# Patient Record
Sex: MALE | Race: BLACK OR AFRICAN AMERICAN | NOT HISPANIC OR LATINO | Employment: UNEMPLOYED | URBAN - METROPOLITAN AREA
[De-identification: names, ages, dates, MRNs, and addresses within clinical notes are randomized per-mention and may not be internally consistent; named-entity substitution may affect disease eponyms.]

---

## 2022-01-24 ENCOUNTER — OFFICE VISIT (OUTPATIENT)
Dept: FAMILY MEDICINE | Facility: CLINIC | Age: 32
End: 2022-01-24
Payer: COMMERCIAL

## 2022-01-24 VITALS
TEMPERATURE: 98 F | RESPIRATION RATE: 18 BRPM | SYSTOLIC BLOOD PRESSURE: 154 MMHG | OXYGEN SATURATION: 98 % | DIASTOLIC BLOOD PRESSURE: 95 MMHG | HEART RATE: 93 BPM | WEIGHT: 183 LBS | HEIGHT: 67 IN | BODY MASS INDEX: 28.72 KG/M2

## 2022-01-24 DIAGNOSIS — K31.84 DIABETIC GASTROPARESIS: ICD-10-CM

## 2022-01-24 DIAGNOSIS — E10.9 TYPE 1 DIABETES MELLITUS WITHOUT COMPLICATION: Primary | Chronic | ICD-10-CM

## 2022-01-24 DIAGNOSIS — E11.43 DIABETIC GASTROPARESIS: ICD-10-CM

## 2022-01-24 DIAGNOSIS — Z11.59 ENCOUNTER FOR SCREENING FOR OTHER VIRAL DISEASES: ICD-10-CM

## 2022-01-24 DIAGNOSIS — R03.0 ELEVATED BP WITHOUT DIAGNOSIS OF HYPERTENSION: ICD-10-CM

## 2022-01-24 LAB
ABS NRBC COUNT: 0 X 10 3/UL (ref 0–0.01)
ABSOLUTE BASOPHIL: 0.05 X 10 3/UL (ref 0–0.22)
ABSOLUTE EOSINOPHIL: 0.12 X 10 3/UL (ref 0.04–0.54)
ABSOLUTE IMMATURE GRAN: 0.03 X 10 3/UL (ref 0–0.04)
ABSOLUTE LYMPHOCYTE: 2.55 X 10 3/UL (ref 0.86–4.75)
ABSOLUTE MONOCYTE: 0.93 X 10 3/UL (ref 0.22–1.08)
ALBUMIN SERPL-MCNC: 4.1 G/DL (ref 3.5–5.2)
ALBUMIN/GLOB SERPL ELPH: 1.6 {RATIO} (ref 1–2.7)
ALP ISOS SERPL LEV INH-CCNC: 107 U/L (ref 40–130)
ALT (SGPT): 13 U/L (ref 0–41)
ANION GAP SERPL CALC-SCNC: 9 MMOL/L (ref 8–17)
AST SERPL-CCNC: 13 U/L (ref 0–40)
BASOPHILS NFR BLD: 0.5 % (ref 0.2–1.2)
BILIRUBIN, TOTAL: 0.25 MG/DL (ref 0–1.2)
BUN/CREAT SERPL: 13.6 (ref 6–20)
CALCIUM SERPL-MCNC: 9.3 MG/DL (ref 8.6–10.2)
CARBON DIOXIDE, CO2: 28 MMOL/L (ref 22–29)
CHLORIDE: 97 MMOL/L (ref 98–107)
CHOLEST SERPL-MSCNC: 154 MG/DL (ref 100–200)
CREAT SERPL-MCNC: 0.85 MG/DL (ref 0.7–1.2)
EOSINOPHIL NFR BLD: 1.2 % (ref 0.7–7)
GFR ESTIMATION: 105.84
GLOBULIN: 2.6 G/DL (ref 1.5–4.5)
GLUCOSE: 494 MG/DL (ref 74–106)
HCT VFR BLD AUTO: 39.3 % (ref 42–52)
HCV IGG SERPL QL IA: NONREACTIVE
HDLC SERPL-MCNC: 75 MG/DL
HGB BLD-MCNC: 12.9 G/DL (ref 14–18)
HIV 1+2 AB+HIV1 P24 AG SERPL QL IA: NONREACTIVE
IMMATURE GRANULOCYTES: 0.3 % (ref 0–0.5)
LDL/HDL RATIO: 0.9 (ref 1–3)
LDLC SERPL CALC-MCNC: 69 MG/DL (ref 0–100)
LYMPHOCYTES NFR BLD: 26 % (ref 19.3–53.1)
MCH RBC QN AUTO: 29.1 PG (ref 27–32)
MCHC RBC AUTO-ENTMCNC: 32.8 G/DL (ref 32–36)
MCV RBC AUTO: 88.7 FL (ref 80–94)
MONOCYTES NFR BLD: 9.5 % (ref 4.7–12.5)
NEUTROPHILS # BLD AUTO: 6.11 X 10 3/UL (ref 2.15–7.56)
NEUTROPHILS NFR BLD: 62.5 % (ref 34–71.1)
NUCLEATED RED BLOOD CELLS: 0 /100 WBC (ref 0–0.2)
PLATELET # BLD AUTO: 316 X 10 3/UL (ref 135–400)
POTASSIUM: 4.8 MMOL/L (ref 3.5–5.1)
PROT SNV-MCNC: 6.7 G/DL (ref 6.4–8.3)
RBC # BLD AUTO: 4.43 X 10 6/UL (ref 4.7–6.1)
RDW-SD: 43.8 FL (ref 37–54)
SODIUM: 134 MMOL/L (ref 136–145)
TRIGL SERPL-MCNC: 50 MG/DL (ref 0–150)
TSH W/REFLEX TO FT4: 0.98 UIU/ML (ref 0.27–4.2)
UREA NITROGEN (BUN): 11.6 MG/DL (ref 6–20)
WBC # BLD: 9.79 X 10 3/UL (ref 4.3–10.8)

## 2022-01-24 PROCEDURE — 1160F RVW MEDS BY RX/DR IN RCRD: CPT | Mod: CPTII,S$GLB,, | Performed by: NURSE PRACTITIONER

## 2022-01-24 PROCEDURE — 99204 PR OFFICE/OUTPT VISIT, NEW, LEVL IV, 45-59 MIN: ICD-10-PCS | Mod: S$GLB,,, | Performed by: NURSE PRACTITIONER

## 2022-01-24 PROCEDURE — 3077F SYST BP >= 140 MM HG: CPT | Mod: CPTII,S$GLB,, | Performed by: NURSE PRACTITIONER

## 2022-01-24 PROCEDURE — 1160F PR REVIEW ALL MEDS BY PRESCRIBER/CLIN PHARMACIST DOCUMENTED: ICD-10-PCS | Mod: CPTII,S$GLB,, | Performed by: NURSE PRACTITIONER

## 2022-01-24 PROCEDURE — 1159F PR MEDICATION LIST DOCUMENTED IN MEDICAL RECORD: ICD-10-PCS | Mod: CPTII,S$GLB,, | Performed by: NURSE PRACTITIONER

## 2022-01-24 PROCEDURE — 3080F DIAST BP >= 90 MM HG: CPT | Mod: CPTII,S$GLB,, | Performed by: NURSE PRACTITIONER

## 2022-01-24 PROCEDURE — 99204 OFFICE O/P NEW MOD 45 MIN: CPT | Mod: S$GLB,,, | Performed by: NURSE PRACTITIONER

## 2022-01-24 PROCEDURE — 1159F MED LIST DOCD IN RCRD: CPT | Mod: CPTII,S$GLB,, | Performed by: NURSE PRACTITIONER

## 2022-01-24 PROCEDURE — 3008F BODY MASS INDEX DOCD: CPT | Mod: CPTII,S$GLB,, | Performed by: NURSE PRACTITIONER

## 2022-01-24 PROCEDURE — 3008F PR BODY MASS INDEX (BMI) DOCUMENTED: ICD-10-PCS | Mod: CPTII,S$GLB,, | Performed by: NURSE PRACTITIONER

## 2022-01-24 PROCEDURE — 3077F PR MOST RECENT SYSTOLIC BLOOD PRESSURE >= 140 MM HG: ICD-10-PCS | Mod: CPTII,S$GLB,, | Performed by: NURSE PRACTITIONER

## 2022-01-24 PROCEDURE — 3080F PR MOST RECENT DIASTOLIC BLOOD PRESSURE >= 90 MM HG: ICD-10-PCS | Mod: CPTII,S$GLB,, | Performed by: NURSE PRACTITIONER

## 2022-01-24 RX ORDER — INSULIN DEGLUDEC 100 U/ML
INJECTION, SOLUTION SUBCUTANEOUS
COMMUNITY
End: 2022-01-24

## 2022-01-24 RX ORDER — INSULIN GLARGINE 300 [IU]/ML
30 INJECTION, SOLUTION SUBCUTANEOUS NIGHTLY
Qty: 1 PEN | Refills: 6 | Status: SHIPPED | OUTPATIENT
Start: 2022-01-24 | End: 2023-03-22

## 2022-01-24 RX ORDER — INSULIN LISPRO 100 [IU]/ML
6 INJECTION, SOLUTION INTRAVENOUS; SUBCUTANEOUS
COMMUNITY
Start: 2022-01-16 | End: 2022-01-24 | Stop reason: SDUPTHER

## 2022-01-24 RX ORDER — METOCLOPRAMIDE 10 MG/1
10 TABLET ORAL
Qty: 90 TABLET | Refills: 0 | Status: SHIPPED | OUTPATIENT
Start: 2022-01-24 | End: 2022-02-14

## 2022-01-24 RX ORDER — INSULIN LISPRO 100 [IU]/ML
6 INJECTION, SOLUTION INTRAVENOUS; SUBCUTANEOUS
Qty: 1 EACH | Refills: 6 | Status: SHIPPED | OUTPATIENT
Start: 2022-01-24 | End: 2022-05-13 | Stop reason: SDUPTHER

## 2022-01-24 RX ORDER — INSULIN GLARGINE 300 [IU]/ML
30 INJECTION, SOLUTION SUBCUTANEOUS NIGHTLY
COMMUNITY
End: 2022-01-24 | Stop reason: SDUPTHER

## 2022-01-24 RX ORDER — FLASH GLUCOSE SENSOR
KIT MISCELLANEOUS
COMMUNITY
Start: 2021-08-25 | End: 2022-05-05

## 2022-01-24 NOTE — PROGRESS NOTES
Subjective:       Patient ID: Tomer Boone is a 31 y.o. male.    Chief Complaint: Establish Care (Pt is here to establish care. Pt also needs refills on in his insulin)    HPI     He is here to establish primary care. 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  He was previously established [facility] with endocrinology. He has a PMH of Type 1 DM. He would like to establish with new endocrinologist in the future-- if needed. He was previously on Reglan for intermittent gastroparesis. Today, his BP is elevated. He reports BP is usually normal.     Review of Systems   Constitutional: Negative for chills, fatigue and fever.   Respiratory: Negative for cough, shortness of breath and wheezing.    Cardiovascular: Negative for chest pain and palpitations.   Gastrointestinal: Negative for abdominal pain, nausea and vomiting.   Musculoskeletal: Negative for joint swelling and myalgias.   Skin: Negative for color change and rash.   Neurological: Negative for dizziness, weakness, light-headedness and headaches.   Psychiatric/Behavioral: Negative for decreased concentration and sleep disturbance. The patient is not nervous/anxious.            Past Medical History:  Past Medical History:   Diagnosis Date    Diabetes mellitus type I       History reviewed. No pertinent surgical history.   Review of patient's allergies indicates:  No Known Allergies   Current Outpatient Medications   Medication Sig Dispense Refill    FREESTYLE MAURICIO 14 DAY SENSOR Kit       HUMALOG KWIKPEN INSULIN 100 unit/mL pen Inject 6 Units into the skin 3 (three) times daily with meals. 1 each 6    insulin glargine U-300 conc (TOUJEO MAX SOLOSTAR) 300 unit/mL (3 mL) insulin pen Inject 30 Units into the skin every evening. 1 pen 6    metoclopramide HCl (REGLAN) 10 MG tablet Take 1 tablet (10 mg total) by mouth 4 (four) times daily before meals and nightly. 90 tablet 0     No current  facility-administered medications for this visit.     Social History     Socioeconomic History    Marital status:    Tobacco Use    Smoking status: Never Smoker    Smokeless tobacco: Never Used   Substance and Sexual Activity    Alcohol use: Yes     Comment: socially    Drug use: Yes     Types: Marijuana    Sexual activity: Yes      History reviewed. No pertinent family history.     Objective:      Physical Exam  Constitutional:       Appearance: He is well-developed.   HENT:      Head: Normocephalic and atraumatic.      Mouth/Throat:      Mouth: Mucous membranes are moist.      Pharynx: Oropharynx is clear.   Eyes:      General: No scleral icterus.     Conjunctiva/sclera: Conjunctivae normal.   Neck:      Trachea: Trachea normal.   Cardiovascular:      Rate and Rhythm: Normal rate and regular rhythm.   Pulmonary:      Effort: Pulmonary effort is normal.      Breath sounds: Normal breath sounds.   Abdominal:      Palpations: Abdomen is soft.   Musculoskeletal:      Cervical back: Normal range of motion and neck supple.   Neurological:      Mental Status: He is alert and oriented to person, place, and time.   Psychiatric:         Mood and Affect: Mood normal.         Speech: Speech normal.         Behavior: Behavior normal.         Assessment:     1. Type 1 diabetes mellitus without complication Stable   2. Diabetic gastroparesis Acute   3. Elevated BP without diagnosis of hypertension Acute   4. Encounter for screening for other viral diseases      Plan:       PROBLEM LIST     Type 1 diabetes mellitus without complication  Comments:  obtaining labs today, including A1c; Renewing his insulin   Orders:  -     insulin glargine U-300 conc (TOUJEO MAX SOLOSTAR) 300 unit/mL (3 mL) insulin pen; Inject 30 Units into the skin every evening.  Dispense: 1 pen; Refill: 6  -     HUMALOG KWIKPEN INSULIN 100 unit/mL pen; Inject 6 Units into the skin 3 (three) times daily with meals.  Dispense: 1 each; Refill: 6  -      CBC Auto Differential  -     Comprehensive Metabolic Panel  -     Lipid Panel  -     TSH w/reflex to FT4    Diabetic gastroparesis  Comments:  renewing Reglan for prn use  Orders:  -     metoclopramide HCl (REGLAN) 10 MG tablet; Take 1 tablet (10 mg total) by mouth 4 (four) times daily before meals and nightly.  Dispense: 90 tablet; Refill: 0    Elevated BP without diagnosis of hypertension  Comments:  will repeat BP in 1 week at f/u appt    Encounter for screening for other viral diseases  -     Hepatitis C antibody  -     HIV 1/2 Ag/Ab (4th Gen)

## 2022-01-27 DIAGNOSIS — E10.9 TYPE 1 DIABETES MELLITUS WITHOUT COMPLICATION: Primary | ICD-10-CM

## 2022-01-27 LAB
ESTIMATED AVERAGE GLUCOSE: 222 MG/DL
HBA1C MFR BLD: 9.4 % (ref 4–6)

## 2022-01-31 ENCOUNTER — OFFICE VISIT (OUTPATIENT)
Dept: FAMILY MEDICINE | Facility: CLINIC | Age: 32
End: 2022-01-31
Payer: COMMERCIAL

## 2022-01-31 VITALS
TEMPERATURE: 98 F | BODY MASS INDEX: 29.03 KG/M2 | HEART RATE: 78 BPM | SYSTOLIC BLOOD PRESSURE: 130 MMHG | OXYGEN SATURATION: 98 % | DIASTOLIC BLOOD PRESSURE: 88 MMHG | HEIGHT: 67 IN | RESPIRATION RATE: 18 BRPM | WEIGHT: 185 LBS

## 2022-01-31 DIAGNOSIS — E10.9 TYPE 1 DIABETES MELLITUS WITHOUT COMPLICATION: Primary | ICD-10-CM

## 2022-01-31 DIAGNOSIS — E11.43 DIABETIC GASTROPARESIS: ICD-10-CM

## 2022-01-31 DIAGNOSIS — K31.84 DIABETIC GASTROPARESIS: ICD-10-CM

## 2022-01-31 PROCEDURE — 3075F PR MOST RECENT SYSTOLIC BLOOD PRESS GE 130-139MM HG: ICD-10-PCS | Mod: CPTII,S$GLB,, | Performed by: NURSE PRACTITIONER

## 2022-01-31 PROCEDURE — 3075F SYST BP GE 130 - 139MM HG: CPT | Mod: CPTII,S$GLB,, | Performed by: NURSE PRACTITIONER

## 2022-01-31 PROCEDURE — 3046F PR MOST RECENT HEMOGLOBIN A1C LEVEL > 9.0%: ICD-10-PCS | Mod: CPTII,S$GLB,, | Performed by: NURSE PRACTITIONER

## 2022-01-31 PROCEDURE — 1160F RVW MEDS BY RX/DR IN RCRD: CPT | Mod: CPTII,S$GLB,, | Performed by: NURSE PRACTITIONER

## 2022-01-31 PROCEDURE — 3079F PR MOST RECENT DIASTOLIC BLOOD PRESSURE 80-89 MM HG: ICD-10-PCS | Mod: CPTII,S$GLB,, | Performed by: NURSE PRACTITIONER

## 2022-01-31 PROCEDURE — 3008F BODY MASS INDEX DOCD: CPT | Mod: CPTII,S$GLB,, | Performed by: NURSE PRACTITIONER

## 2022-01-31 PROCEDURE — 1160F PR REVIEW ALL MEDS BY PRESCRIBER/CLIN PHARMACIST DOCUMENTED: ICD-10-PCS | Mod: CPTII,S$GLB,, | Performed by: NURSE PRACTITIONER

## 2022-01-31 PROCEDURE — 1159F MED LIST DOCD IN RCRD: CPT | Mod: CPTII,S$GLB,, | Performed by: NURSE PRACTITIONER

## 2022-01-31 PROCEDURE — 3008F PR BODY MASS INDEX (BMI) DOCUMENTED: ICD-10-PCS | Mod: CPTII,S$GLB,, | Performed by: NURSE PRACTITIONER

## 2022-01-31 PROCEDURE — 3079F DIAST BP 80-89 MM HG: CPT | Mod: CPTII,S$GLB,, | Performed by: NURSE PRACTITIONER

## 2022-01-31 PROCEDURE — 99213 PR OFFICE/OUTPT VISIT, EST, LEVL III, 20-29 MIN: ICD-10-PCS | Mod: S$GLB,,, | Performed by: NURSE PRACTITIONER

## 2022-01-31 PROCEDURE — 1159F PR MEDICATION LIST DOCUMENTED IN MEDICAL RECORD: ICD-10-PCS | Mod: CPTII,S$GLB,, | Performed by: NURSE PRACTITIONER

## 2022-01-31 PROCEDURE — 99213 OFFICE O/P EST LOW 20 MIN: CPT | Mod: S$GLB,,, | Performed by: NURSE PRACTITIONER

## 2022-01-31 PROCEDURE — 3046F HEMOGLOBIN A1C LEVEL >9.0%: CPT | Mod: CPTII,S$GLB,, | Performed by: NURSE PRACTITIONER

## 2022-01-31 RX ORDER — FLASH GLUCOSE SCANNING READER
1 EACH MISCELLANEOUS 3 TIMES DAILY
Qty: 1 EACH | Refills: 0 | Status: SHIPPED | OUTPATIENT
Start: 2022-01-31

## 2022-01-31 RX ORDER — FLASH GLUCOSE SENSOR
1 KIT MISCELLANEOUS
Qty: 1 KIT | Refills: 0 | Status: SHIPPED | OUTPATIENT
Start: 2022-01-31 | End: 2022-02-15

## 2022-01-31 NOTE — PROGRESS NOTES
Subjective:       Patient ID: Tomer Boone is a 31 y.o. male.    Chief Complaint: Follow-up (Pt is here for a one week f/u. Pt states his new meds are working fine. 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  He was previously established [facility] with endocrinology. He has a PMH of Type 1 DM. Most recent A1c 9.4%--but he ran out of one of his insulins. He would like to establish with new endocrinologist in the future-- if needed. He was previously on Reglan for intermittent gastroparesis. At our previous appt, his BP was elevated. BP is normal today.     Review of Systems   Constitutional: Negative for chills, fatigue and fever.   Respiratory: Negative for cough, shortness of breath and wheezing.    Cardiovascular: Negative for chest pain and palpitations.   Gastrointestinal: Negative for abdominal pain, nausea and vomiting.   Musculoskeletal: Negative for joint swelling and myalgias.   Skin: Negative for color change and rash.   Neurological: Negative for dizziness, weakness, light-headedness and headaches.   Psychiatric/Behavioral: Negative for decreased concentration and sleep disturbance. The patient is not nervous/anxious.            Past Medical History:  Past Medical History:   Diagnosis Date    Diabetes mellitus type I       No past surgical history on file.   Review of patient's allergies indicates:  No Known Allergies   Current Outpatient Medications   Medication Sig Dispense Refill    FREESTYLE MAURICIO 14 DAY SENSOR Kit       HUMALOG KWIKPEN INSULIN 100 unit/mL pen Inject 6 Units into the skin 3 (three) times daily with meals. 1 each 6    insulin glargine U-300 conc (TOUJEO MAX SOLOSTAR) 300 unit/mL (3 mL) insulin pen Inject 30 Units into the skin every evening. 1 pen 6    metoclopramide HCl (REGLAN) 10 MG tablet Take 1 tablet (10 mg total) by mouth 4 (four) times  daily before meals and nightly. 90 tablet 0    FREESTYLE MAURICIO 2 READER Misc 1 Units by Misc.(Non-Drug; Combo Route) route 3 (three) times daily. 1 each 0    FREESTYLE MAURICIO 2 SENSOR Kit 1 Units by Misc.(Non-Drug; Combo Route) route every 14 (fourteen) days. 1 kit 0     No current facility-administered medications for this visit.     Social History     Socioeconomic History    Marital status:    Tobacco Use    Smoking status: Never Smoker    Smokeless tobacco: Never Used   Substance and Sexual Activity    Alcohol use: Yes     Comment: socially    Drug use: Yes     Types: Marijuana    Sexual activity: Yes      No family history on file.     Objective:      Physical Exam  Constitutional:       Appearance: He is well-developed.   HENT:      Head: Normocephalic and atraumatic.      Mouth/Throat:      Mouth: Mucous membranes are moist.      Pharynx: Oropharynx is clear.   Eyes:      General: No scleral icterus.     Conjunctiva/sclera: Conjunctivae normal.   Neck:      Trachea: Trachea normal.   Cardiovascular:      Rate and Rhythm: Normal rate and regular rhythm.   Pulmonary:      Effort: Pulmonary effort is normal.      Breath sounds: Normal breath sounds.   Abdominal:      Palpations: Abdomen is soft.   Musculoskeletal:      Cervical back: Normal range of motion and neck supple.   Neurological:      Mental Status: He is alert and oriented to person, place, and time.   Psychiatric:         Mood and Affect: Mood normal.         Speech: Speech normal.         Behavior: Behavior normal.         Assessment:     1. Type 1 diabetes mellitus without complication    2. Diabetic gastroparesis      Plan:       PROBLEM LIST     Type 1 diabetes mellitus without complication  -     FREESTYLE MAURICIO 2 READER Misc; 1 Units by Misc.(Non-Drug; Combo Route) route 3 (three) times daily.  Dispense: 1 each; Refill: 0  -     FREESTYLE MAURICIO 2 SENSOR Kit; 1 Units by Misc.(Non-Drug; Combo Route) route every 14 (fourteen) days.   Dispense: 1 kit; Refill: 0    Diabetic gastroparesis        Ordering Freestyle Seymour 2; adhesive on Sensors of Freestle Lire 1 is weak, so sensors do not stay adhered.     He is going to clean up diet, meal plan. Now that he is back on ALL his medications, I expect his A1c will improve by our next appt in 12 weeks.     Gastroparesis symptoms improved w/ Reglan.

## 2022-03-02 DIAGNOSIS — E10.9 TYPE 1 DIABETES MELLITUS WITHOUT COMPLICATION: ICD-10-CM

## 2022-03-02 RX ORDER — FLASH GLUCOSE SENSOR
KIT MISCELLANEOUS
Qty: 1 KIT | Refills: 3 | OUTPATIENT
Start: 2022-03-02

## 2022-03-02 RX ORDER — FLASH GLUCOSE SENSOR
KIT MISCELLANEOUS
Qty: 1 KIT | Refills: 0 | Status: CANCELLED | OUTPATIENT
Start: 2022-03-02

## 2022-05-04 DIAGNOSIS — R11.0 NAUSEA: Primary | ICD-10-CM

## 2022-05-04 RX ORDER — ONDANSETRON 4 MG/1
4 TABLET, FILM COATED ORAL EVERY 8 HOURS PRN
Qty: 20 TABLET | Refills: 0 | Status: SHIPPED | OUTPATIENT
Start: 2022-05-04 | End: 2022-11-21

## 2022-05-05 ENCOUNTER — OFFICE VISIT (OUTPATIENT)
Dept: FAMILY MEDICINE | Facility: CLINIC | Age: 32
End: 2022-05-05
Payer: COMMERCIAL

## 2022-05-05 VITALS
BODY MASS INDEX: 27 KG/M2 | HEART RATE: 77 BPM | WEIGHT: 172 LBS | HEIGHT: 67 IN | OXYGEN SATURATION: 98 % | DIASTOLIC BLOOD PRESSURE: 86 MMHG | TEMPERATURE: 98 F | SYSTOLIC BLOOD PRESSURE: 118 MMHG | RESPIRATION RATE: 18 BRPM

## 2022-05-05 DIAGNOSIS — E10.9 TYPE 1 DIABETES MELLITUS WITHOUT COMPLICATION: Primary | Chronic | ICD-10-CM

## 2022-05-05 DIAGNOSIS — E11.43 DIABETIC GASTROPARESIS: ICD-10-CM

## 2022-05-05 DIAGNOSIS — M24.849 LOCKING FINGER JOINT: ICD-10-CM

## 2022-05-05 DIAGNOSIS — K31.84 DIABETIC GASTROPARESIS: ICD-10-CM

## 2022-05-05 DIAGNOSIS — M79.641 RIGHT HAND PAIN: ICD-10-CM

## 2022-05-05 LAB — HBA1C MFR BLD: 7.3 %

## 2022-05-05 PROCEDURE — 3046F HEMOGLOBIN A1C LEVEL >9.0%: CPT | Mod: CPTII,S$GLB,, | Performed by: NURSE PRACTITIONER

## 2022-05-05 PROCEDURE — 3079F DIAST BP 80-89 MM HG: CPT | Mod: CPTII,S$GLB,, | Performed by: NURSE PRACTITIONER

## 2022-05-05 PROCEDURE — 3008F BODY MASS INDEX DOCD: CPT | Mod: CPTII,S$GLB,, | Performed by: NURSE PRACTITIONER

## 2022-05-05 PROCEDURE — 1160F PR REVIEW ALL MEDS BY PRESCRIBER/CLIN PHARMACIST DOCUMENTED: ICD-10-PCS | Mod: CPTII,S$GLB,, | Performed by: NURSE PRACTITIONER

## 2022-05-05 PROCEDURE — 1159F PR MEDICATION LIST DOCUMENTED IN MEDICAL RECORD: ICD-10-PCS | Mod: CPTII,S$GLB,, | Performed by: NURSE PRACTITIONER

## 2022-05-05 PROCEDURE — 3079F PR MOST RECENT DIASTOLIC BLOOD PRESSURE 80-89 MM HG: ICD-10-PCS | Mod: CPTII,S$GLB,, | Performed by: NURSE PRACTITIONER

## 2022-05-05 PROCEDURE — 1159F MED LIST DOCD IN RCRD: CPT | Mod: CPTII,S$GLB,, | Performed by: NURSE PRACTITIONER

## 2022-05-05 PROCEDURE — 3046F PR MOST RECENT HEMOGLOBIN A1C LEVEL > 9.0%: ICD-10-PCS | Mod: CPTII,S$GLB,, | Performed by: NURSE PRACTITIONER

## 2022-05-05 PROCEDURE — 1160F RVW MEDS BY RX/DR IN RCRD: CPT | Mod: CPTII,S$GLB,, | Performed by: NURSE PRACTITIONER

## 2022-05-05 PROCEDURE — 99213 OFFICE O/P EST LOW 20 MIN: CPT | Mod: 25,S$GLB,, | Performed by: NURSE PRACTITIONER

## 2022-05-05 PROCEDURE — 99213 PR OFFICE/OUTPT VISIT, EST, LEVL III, 20-29 MIN: ICD-10-PCS | Mod: 25,S$GLB,, | Performed by: NURSE PRACTITIONER

## 2022-05-05 PROCEDURE — 83036 PR  GLYCOSYLATED HEMOGLOBIN TEST: ICD-10-PCS | Mod: QW,S$GLB,, | Performed by: NURSE PRACTITIONER

## 2022-05-05 PROCEDURE — 3074F SYST BP LT 130 MM HG: CPT | Mod: CPTII,S$GLB,, | Performed by: NURSE PRACTITIONER

## 2022-05-05 PROCEDURE — 83036 HEMOGLOBIN GLYCOSYLATED A1C: CPT | Mod: QW,S$GLB,, | Performed by: NURSE PRACTITIONER

## 2022-05-05 PROCEDURE — 3074F PR MOST RECENT SYSTOLIC BLOOD PRESSURE < 130 MM HG: ICD-10-PCS | Mod: CPTII,S$GLB,, | Performed by: NURSE PRACTITIONER

## 2022-05-05 PROCEDURE — 3008F PR BODY MASS INDEX (BMI) DOCUMENTED: ICD-10-PCS | Mod: CPTII,S$GLB,, | Performed by: NURSE PRACTITIONER

## 2022-05-05 RX ORDER — DICLOFENAC SODIUM 10 MG/G
2 GEL TOPICAL 4 TIMES DAILY
Qty: 100 G | Refills: 3 | Status: SHIPPED | OUTPATIENT
Start: 2022-05-05

## 2022-05-05 RX ORDER — METOCLOPRAMIDE 10 MG/1
TABLET ORAL
Qty: 90 TABLET | Refills: 3 | Status: SHIPPED | OUTPATIENT
Start: 2022-05-05

## 2022-05-05 NOTE — PROGRESS NOTES
Subjective:       Patient ID: Tomer Boone is a 31 y.o. male.    Chief Complaint: Follow-up (Pt is here for a 12 week follow up and A1c check. 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  He was previously established [facility] with endocrinology. He has a PMH of Type 1 DM. Most recent A1c 9.4% Jan 2022.   He was previously on Reglan for intermittent gastroparesis. He has cleaned up his diet. Now A1c 7.3%. He is currently having some nausea, no vomiting.     Right hand cramping, 5th digit sometimes sticks. Interferes with work as a  and his ability to game. Duration of symptoms 2 mo.     Review of Systems   Constitutional: Negative for chills, fatigue and fever.   Respiratory: Negative for cough, shortness of breath and wheezing.    Cardiovascular: Negative for chest pain and palpitations.   Gastrointestinal: Positive for nausea. Negative for abdominal pain and vomiting.   Musculoskeletal: Negative for joint swelling and myalgias.   Skin: Negative for color change and rash.   Neurological: Negative for dizziness, weakness, light-headedness and headaches.   Psychiatric/Behavioral: Negative for decreased concentration and sleep disturbance. The patient is not nervous/anxious.            Past Medical History:  Past Medical History:   Diagnosis Date    Diabetes mellitus type I       History reviewed. No pertinent surgical history.   Review of patient's allergies indicates:  No Known Allergies   Current Outpatient Medications   Medication Sig Dispense Refill    FREESTYLE MAURICIO 2 READER Misc 1 Units by Misc.(Non-Drug; Combo Route) route 3 (three) times daily. 1 each 0    FREESTYLE MAURICIO 2 SENSOR Kit Use as directed 1 kit 11    HUMALOG KWIKPEN INSULIN 100 unit/mL pen Inject 6 Units into the skin 3 (three) times daily with meals. 1 each 6    insulin glargine U-300 conc  (ANDREAIMANIJEO MAX SOLOSTAR) 300 unit/mL (3 mL) insulin pen Inject 30 Units into the skin every evening. 1 pen 6    ondansetron (ZOFRAN) 4 MG tablet Take 1 tablet (4 mg total) by mouth every 8 (eight) hours as needed for Nausea. 20 tablet 0    diclofenac sodium (VOLTAREN) 1 % Gel Apply 2 g topically 4 (four) times daily. 100 g 3    FREESTYLE MAURICIO 14 DAY SENSOR Kit       metoclopramide HCl (REGLAN) 10 MG tablet TAKE 1 TABLET BY MOUTH FOUR TIMES A DAY BEFORE MEALS AND AT BEDTIME 90 tablet 3     No current facility-administered medications for this visit.     Social History     Socioeconomic History    Marital status:    Tobacco Use    Smoking status: Never Smoker    Smokeless tobacco: Never Used   Substance and Sexual Activity    Alcohol use: Yes     Comment: socially    Drug use: Yes     Types: Marijuana    Sexual activity: Yes      History reviewed. No pertinent family history.     Objective:      Physical Exam  Constitutional:       Appearance: He is well-developed.   HENT:      Head: Normocephalic and atraumatic.      Mouth/Throat:      Mouth: Mucous membranes are moist.      Pharynx: Oropharynx is clear.   Eyes:      General: No scleral icterus.     Conjunctiva/sclera: Conjunctivae normal.   Neck:      Trachea: Trachea normal.   Cardiovascular:      Rate and Rhythm: Normal rate and regular rhythm.      Pulses:           Dorsalis pedis pulses are 2+ on the right side and 2+ on the left side.        Posterior tibial pulses are 2+ on the right side and 2+ on the left side.   Pulmonary:      Effort: Pulmonary effort is normal.      Breath sounds: Normal breath sounds.   Abdominal:      Palpations: Abdomen is soft.   Musculoskeletal:      Cervical back: Normal range of motion and neck supple.      Right foot: Normal range of motion. No deformity.      Left foot: Normal range of motion. No deformity.   Feet:      Right foot:      Skin integrity: Dry skin present. No ulcer, blister, skin breakdown, callus or  fissure.      Toenail Condition: Right toenails are normal.      Left foot:      Skin integrity: Dry skin present. No ulcer, blister, skin breakdown, callus or fissure.      Toenail Condition: Left toenails are normal.   Neurological:      Mental Status: He is alert and oriented to person, place, and time.   Psychiatric:         Mood and Affect: Mood normal.         Speech: Speech normal.         Behavior: Behavior normal.         Assessment:     1. Type 1 diabetes mellitus without complication Stable   2. Diabetic gastroparesis Acute   3. Right hand pain Active   4. Locking finger joint Active     Plan:       PROBLEM LIST     Type 1 diabetes mellitus without complication  Comments:  A1c was 9.4%, now 7.3%--Excellent!!!; Repeat A1c 12 mo; for dry skin on feet, start Flexitol Heel Balm.    Orders:  -     Hemoglobin A1C, POCT  -     Microalbumin/Creatinine Ratio, urine    Diabetic gastroparesis  Comments:  renewing Reglan for prn use; zofran for when he is at work since reglan makes him sleepy  Orders:  -     metoclopramide HCl (REGLAN) 10 MG tablet; TAKE 1 TABLET BY MOUTH FOUR TIMES A DAY BEFORE MEALS AND AT BEDTIME  Dispense: 90 tablet; Refill: 3    Right hand pain  Comments:  diclofenac gel QID x 1 mo; Call or RTC for persistet or worsening sx. Consider imaging if no improvement.   Orders:  -     diclofenac sodium (VOLTAREN) 1 % Gel; Apply 2 g topically 4 (four) times daily.  Dispense: 100 g; Refill: 3    Locking finger joint  Comments:  as above        UTD eye exam 12/2021; obtaining urine microalbumin today. A1c 7.3%--fantastic!!

## 2022-05-06 LAB
CREATININE RANDOM URINE: 580.9 MG/DL (ref 39–259)
MICROALBUMIN QUANT: 84 MG/DL (ref 0–2)
MICROALBUMIN/CREATININE RATIO: 144.6 UG/MG (ref 0–30)

## 2022-05-13 ENCOUNTER — PATIENT MESSAGE (OUTPATIENT)
Dept: FAMILY MEDICINE | Facility: CLINIC | Age: 32
End: 2022-05-13
Payer: COMMERCIAL

## 2022-05-13 DIAGNOSIS — E10.9 TYPE 1 DIABETES MELLITUS WITHOUT COMPLICATION: Chronic | ICD-10-CM

## 2022-05-13 RX ORDER — INSULIN LISPRO 100 [IU]/ML
6 INJECTION, SOLUTION INTRAVENOUS; SUBCUTANEOUS
Qty: 1 EACH | Refills: 6 | Status: SHIPPED | OUTPATIENT
Start: 2022-05-13 | End: 2022-05-13

## 2022-11-21 ENCOUNTER — OFFICE VISIT (OUTPATIENT)
Dept: FAMILY MEDICINE | Facility: CLINIC | Age: 32
End: 2022-11-21
Payer: COMMERCIAL

## 2022-11-21 VITALS
WEIGHT: 180 LBS | OXYGEN SATURATION: 98 % | BODY MASS INDEX: 28.25 KG/M2 | HEART RATE: 85 BPM | RESPIRATION RATE: 18 BRPM | DIASTOLIC BLOOD PRESSURE: 89 MMHG | SYSTOLIC BLOOD PRESSURE: 133 MMHG | TEMPERATURE: 97 F | HEIGHT: 67 IN

## 2022-11-21 DIAGNOSIS — E10.9 TYPE 1 DIABETES MELLITUS WITHOUT COMPLICATION: Primary | Chronic | ICD-10-CM

## 2022-11-21 LAB
ABS NRBC COUNT: 0 X 10 3/UL (ref 0–0.01)
ABSOLUTE BASOPHIL: 0.04 X 10 3/UL (ref 0–0.22)
ABSOLUTE EOSINOPHIL: 0.11 X 10 3/UL (ref 0.04–0.54)
ABSOLUTE IMMATURE GRAN: 0.01 X 10 3/UL (ref 0–0.04)
ABSOLUTE LYMPHOCYTE: 3.04 X 10 3/UL (ref 0.86–4.75)
ABSOLUTE MONOCYTE: 0.7 X 10 3/UL (ref 0.22–1.08)
ALBUMIN SERPL-MCNC: 4.5 G/DL (ref 3.5–5.2)
ALBUMIN/GLOB SERPL ELPH: 2.1 {RATIO} (ref 1–2.7)
ALP ISOS SERPL LEV INH-CCNC: 64 U/L (ref 40–130)
ALT (SGPT): 15 U/L (ref 0–41)
ANION GAP SERPL CALC-SCNC: 10 MMOL/L (ref 8–17)
AST SERPL-CCNC: 14 U/L (ref 0–40)
BASOPHILS NFR BLD: 0.6 % (ref 0.2–1.2)
BILIRUBIN, TOTAL: 0.45 MG/DL (ref 0–1.2)
BUN/CREAT SERPL: 14.4 (ref 6–20)
CALCIUM SERPL-MCNC: 9.3 MG/DL (ref 8.6–10.2)
CARBON DIOXIDE, CO2: 28 MMOL/L (ref 22–29)
CHLORIDE: 106 MMOL/L (ref 98–107)
CHOLEST SERPL-MSCNC: 144 MG/DL (ref 100–200)
CREAT SERPL-MCNC: 1.02 MG/DL (ref 0.7–1.2)
EOSINOPHIL NFR BLD: 1.5 % (ref 0.7–7)
GFR ESTIMATION: 100.77
GLOBULIN: 2.1 G/DL (ref 1.5–4.5)
GLUCOSE: 85 MG/DL (ref 74–106)
HBA1C MFR BLD: 7.3 % (ref 4–6)
HCT VFR BLD AUTO: 38.9 % (ref 42–52)
HDLC SERPL-MCNC: 79 MG/DL
HGB BLD-MCNC: 13.2 G/DL (ref 14–18)
IMMATURE GRANULOCYTES: 0.1 % (ref 0–0.5)
LDL/HDL RATIO: NORMAL
LDLC SERPL CALC-MCNC: NORMAL MG/DL
LYMPHOCYTES NFR BLD: 42 % (ref 19.3–53.1)
MCH RBC QN AUTO: 30.3 PG (ref 27–32)
MCHC RBC AUTO-ENTMCNC: 33.9 G/DL (ref 32–36)
MCV RBC AUTO: 89.2 FL (ref 80–94)
MONOCYTES NFR BLD: 9.7 % (ref 4.7–12.5)
NEUTROPHILS # BLD AUTO: 3.34 X 10 3/UL (ref 2.15–7.56)
NEUTROPHILS NFR BLD: 46.1 % (ref 34–71.1)
NUCLEATED RED BLOOD CELLS: 0 /100 WBC (ref 0–0.2)
PLATELET # BLD AUTO: 320 X 10 3/UL (ref 135–400)
POTASSIUM: 4 MMOL/L (ref 3.5–5.1)
PROT SNV-MCNC: 6.6 G/DL (ref 6.4–8.3)
RBC # BLD AUTO: 4.36 X 10 6/UL (ref 4.7–6.1)
RDW-SD: 44.5 FL (ref 37–54)
SODIUM: 144 MMOL/L (ref 136–145)
TRIGL SERPL-MCNC: 35 MG/DL (ref 0–150)
TSH W/REFLEX TO FT4: 0.64 UIU/ML (ref 0.27–4.2)
UREA NITROGEN (BUN): 14.7 MG/DL (ref 6–20)
WBC # BLD: 7.24 X 10 3/UL (ref 4.3–10.8)

## 2022-11-21 PROCEDURE — 3079F DIAST BP 80-89 MM HG: CPT | Mod: CPTII,S$GLB,, | Performed by: NURSE PRACTITIONER

## 2022-11-21 PROCEDURE — 1159F MED LIST DOCD IN RCRD: CPT | Mod: CPTII,S$GLB,, | Performed by: NURSE PRACTITIONER

## 2022-11-21 PROCEDURE — 3066F PR DOCUMENTATION OF TREATMENT FOR NEPHROPATHY: ICD-10-PCS | Mod: CPTII,S$GLB,, | Performed by: NURSE PRACTITIONER

## 2022-11-21 PROCEDURE — 3008F PR BODY MASS INDEX (BMI) DOCUMENTED: ICD-10-PCS | Mod: CPTII,S$GLB,, | Performed by: NURSE PRACTITIONER

## 2022-11-21 PROCEDURE — 3060F PR POS MICROALBUMINURIA RESULT DOCUMENTED/REVIEW: ICD-10-PCS | Mod: CPTII,S$GLB,, | Performed by: NURSE PRACTITIONER

## 2022-11-21 PROCEDURE — 3075F SYST BP GE 130 - 139MM HG: CPT | Mod: CPTII,S$GLB,, | Performed by: NURSE PRACTITIONER

## 2022-11-21 PROCEDURE — 3051F PR MOST RECENT HEMOGLOBIN A1C LEVEL 7.0 - < 8.0%: ICD-10-PCS | Mod: CPTII,S$GLB,, | Performed by: NURSE PRACTITIONER

## 2022-11-21 PROCEDURE — 83036 PR  GLYCOSYLATED HEMOGLOBIN TEST: ICD-10-PCS | Mod: QW,S$GLB,, | Performed by: NURSE PRACTITIONER

## 2022-11-21 PROCEDURE — 3060F POS MICROALBUMINURIA REV: CPT | Mod: CPTII,S$GLB,, | Performed by: NURSE PRACTITIONER

## 2022-11-21 PROCEDURE — 3075F PR MOST RECENT SYSTOLIC BLOOD PRESS GE 130-139MM HG: ICD-10-PCS | Mod: CPTII,S$GLB,, | Performed by: NURSE PRACTITIONER

## 2022-11-21 PROCEDURE — 3079F PR MOST RECENT DIASTOLIC BLOOD PRESSURE 80-89 MM HG: ICD-10-PCS | Mod: CPTII,S$GLB,, | Performed by: NURSE PRACTITIONER

## 2022-11-21 PROCEDURE — 3066F NEPHROPATHY DOC TX: CPT | Mod: CPTII,S$GLB,, | Performed by: NURSE PRACTITIONER

## 2022-11-21 PROCEDURE — 1159F PR MEDICATION LIST DOCUMENTED IN MEDICAL RECORD: ICD-10-PCS | Mod: CPTII,S$GLB,, | Performed by: NURSE PRACTITIONER

## 2022-11-21 PROCEDURE — 83036 HEMOGLOBIN GLYCOSYLATED A1C: CPT | Mod: QW,S$GLB,, | Performed by: NURSE PRACTITIONER

## 2022-11-21 PROCEDURE — 83036 HEMOGLOBIN GLYCOSYLATED A1C: CPT | Mod: QW,,, | Performed by: NURSE PRACTITIONER

## 2022-11-21 PROCEDURE — 3008F BODY MASS INDEX DOCD: CPT | Mod: CPTII,S$GLB,, | Performed by: NURSE PRACTITIONER

## 2022-11-21 PROCEDURE — 3051F HG A1C>EQUAL 7.0%<8.0%: CPT | Mod: CPTII,S$GLB,, | Performed by: NURSE PRACTITIONER

## 2022-11-21 PROCEDURE — 99214 PR OFFICE/OUTPT VISIT, EST, LEVL IV, 30-39 MIN: ICD-10-PCS | Mod: 25,S$GLB,, | Performed by: NURSE PRACTITIONER

## 2022-11-21 PROCEDURE — 99214 OFFICE O/P EST MOD 30 MIN: CPT | Mod: 25,S$GLB,, | Performed by: NURSE PRACTITIONER

## 2022-11-21 RX ORDER — INSULIN LISPRO 100 [IU]/ML
INJECTION, SOLUTION INTRAVENOUS; SUBCUTANEOUS
Qty: 16.2 ML | Refills: 1 | Status: SHIPPED | OUTPATIENT
Start: 2022-11-21 | End: 2023-05-10

## 2022-11-21 NOTE — PROGRESS NOTES
Subjective:       Patient ID: Tomer Boone is a 32 y.o. male.    Chief Complaint: Follow-up (Pt is here for a routine 6 month check up and a1c check up. Pt wants to discuss his insulin. 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  He was previously established [facility] with endocrinology. He has a PMH of Type 1 DM. He would like to establish with new endocrinologist in the future-- if needed. He was previously on Reglan for intermittent gastroparesis--this has not been a problem lately. His last A1c was 7.2%. Today his A1c is 7.3%. He is needing refills of his insulin.       Review of Systems   Constitutional:  Negative for chills, fatigue and fever.   Respiratory:  Negative for cough, shortness of breath and wheezing.    Cardiovascular:  Negative for chest pain and palpitations.   Gastrointestinal:  Negative for abdominal pain, nausea and vomiting.   Endocrine: Negative for polydipsia, polyphagia and polyuria.   Skin:  Negative for rash and wound.   Neurological:  Negative for dizziness, weakness, light-headedness and headaches.         Past Medical History:  Past Medical History:   Diagnosis Date    Diabetes mellitus type I       History reviewed. No pertinent surgical history.   Review of patient's allergies indicates:  No Known Allergies   Current Outpatient Medications   Medication Sig Dispense Refill    diclofenac sodium (VOLTAREN) 1 % Gel Apply 2 g topically 4 (four) times daily. 100 g 3    FREESTYLE MAURICIO 2 READER Misc 1 Units by Misc.(Non-Drug; Combo Route) route 3 (three) times daily. 1 each 0    FREESTYLE MAURICIO 2 SENSOR Kit Use as directed 1 kit 11    insulin glargine U-300 conc (TOUJEO MAX SOLOSTAR) 300 unit/mL (3 mL) insulin pen Inject 30 Units into the skin every evening. 1 pen 6    metoclopramide HCl (REGLAN) 10 MG tablet TAKE 1 TABLET BY MOUTH FOUR TIMES A DAY BEFORE MEALS AND AT BEDTIME 90 tablet  3    HUMALOG KWIKPEN INSULIN 100 unit/mL pen Inject subcutaneously TID with meals per sliding scale:   0 units 131-180   4 units 181-240   8 units 241-300   10 units 301-350   12 units 351-400   16 units   > 400 notify PCP 16.2 mL 1     No current facility-administered medications for this visit.     Social History     Socioeconomic History    Marital status:    Tobacco Use    Smoking status: Never    Smokeless tobacco: Never   Substance and Sexual Activity    Alcohol use: Yes     Comment: socially    Drug use: Yes     Types: Marijuana    Sexual activity: Yes      History reviewed. No pertinent family history.     Objective:      Physical Exam  Constitutional:       Appearance: He is well-developed.   HENT:      Head: Normocephalic and atraumatic.      Mouth/Throat:      Mouth: Mucous membranes are moist.      Pharynx: Oropharynx is clear.   Eyes:      General: No scleral icterus.     Conjunctiva/sclera: Conjunctivae normal.   Neck:      Trachea: Trachea normal.   Cardiovascular:      Rate and Rhythm: Normal rate and regular rhythm.   Pulmonary:      Effort: Pulmonary effort is normal.      Breath sounds: Normal breath sounds.   Musculoskeletal:      Cervical back: Normal range of motion and neck supple.   Neurological:      Mental Status: He is alert and oriented to person, place, and time.   Psychiatric:         Mood and Affect: Mood normal.         Speech: Speech normal.         Behavior: Behavior normal.       Assessment:     1. Type 1 diabetes mellitus without complication Stable     Plan:       PROBLEM LIST     Type 1 diabetes mellitus without complication  Comments:  obtaining labs today, POC A1c 7.3%; Renewing his insulin--starting him on a SS  Orders:  -     Hemoglobin A1C, POCT  -     HUMALOG KWIKPEN INSULIN 100 unit/mL pen; Inject subcutaneously TID with meals per sliding scale:   0 units 131-180   4 units 181-240   8 units 241-300   10 units 301-350   12 units 351-400   16 units   >  400 notify PCP  Dispense: 16.2 mL; Refill: 1  -     CBC Auto Differential  -     Comprehensive Metabolic Panel  -     Lipid Panel  -     TSH w/reflex to FT4

## 2022-11-22 PROBLEM — E10.9 TYPE 1 DIABETES MELLITUS: Status: ACTIVE | Noted: 2017-06-08

## 2022-11-23 ENCOUNTER — PATIENT MESSAGE (OUTPATIENT)
Dept: FAMILY MEDICINE | Facility: CLINIC | Age: 32
End: 2022-11-23
Payer: COMMERCIAL

## 2022-12-01 ENCOUNTER — PATIENT MESSAGE (OUTPATIENT)
Dept: FAMILY MEDICINE | Facility: CLINIC | Age: 32
End: 2022-12-01
Payer: COMMERCIAL

## 2022-12-28 ENCOUNTER — OFFICE VISIT (OUTPATIENT)
Dept: FAMILY MEDICINE | Facility: CLINIC | Age: 32
End: 2022-12-28
Payer: COMMERCIAL

## 2022-12-28 VITALS
DIASTOLIC BLOOD PRESSURE: 83 MMHG | SYSTOLIC BLOOD PRESSURE: 133 MMHG | BODY MASS INDEX: 28.25 KG/M2 | OXYGEN SATURATION: 97 % | WEIGHT: 180 LBS | HEART RATE: 95 BPM | TEMPERATURE: 98 F | RESPIRATION RATE: 18 BRPM | HEIGHT: 67 IN

## 2022-12-28 DIAGNOSIS — E10.9 TYPE 1 DIABETES MELLITUS WITHOUT COMPLICATION: Primary | Chronic | ICD-10-CM

## 2022-12-28 DIAGNOSIS — Z20.2 POSSIBLE EXPOSURE TO STD: ICD-10-CM

## 2022-12-28 DIAGNOSIS — R30.0 DYSURIA: ICD-10-CM

## 2022-12-28 DIAGNOSIS — Z72.51 HIGH RISK HETEROSEXUAL BEHAVIOR: ICD-10-CM

## 2022-12-28 DIAGNOSIS — M79.89 NODULE OF SOFT TISSUE: ICD-10-CM

## 2022-12-28 DIAGNOSIS — Z23 FLU VACCINE NEED: ICD-10-CM

## 2022-12-28 LAB
HIV 1+2 AB+HIV1 P24 AG SERPL QL IA: NONREACTIVE
SYPHILIS TREPONEMAL ANTIBODY: NONREACTIVE

## 2022-12-28 PROCEDURE — 3060F PR POS MICROALBUMINURIA RESULT DOCUMENTED/REVIEW: ICD-10-PCS | Mod: CPTII,S$GLB,, | Performed by: NURSE PRACTITIONER

## 2022-12-28 PROCEDURE — 90686 IIV4 VACC NO PRSV 0.5 ML IM: CPT | Mod: S$GLB,,, | Performed by: NURSE PRACTITIONER

## 2022-12-28 PROCEDURE — 90686 FLU VACCINE (QUAD) GREATER THAN OR EQUAL TO 3YO PRESERVATIVE FREE IM: ICD-10-PCS | Mod: S$GLB,,, | Performed by: NURSE PRACTITIONER

## 2022-12-28 PROCEDURE — 1160F PR REVIEW ALL MEDS BY PRESCRIBER/CLIN PHARMACIST DOCUMENTED: ICD-10-PCS | Mod: CPTII,S$GLB,, | Performed by: NURSE PRACTITIONER

## 2022-12-28 PROCEDURE — 1159F MED LIST DOCD IN RCRD: CPT | Mod: CPTII,S$GLB,, | Performed by: NURSE PRACTITIONER

## 2022-12-28 PROCEDURE — 3008F PR BODY MASS INDEX (BMI) DOCUMENTED: ICD-10-PCS | Mod: CPTII,S$GLB,, | Performed by: NURSE PRACTITIONER

## 2022-12-28 PROCEDURE — 3079F PR MOST RECENT DIASTOLIC BLOOD PRESSURE 80-89 MM HG: ICD-10-PCS | Mod: CPTII,S$GLB,, | Performed by: NURSE PRACTITIONER

## 2022-12-28 PROCEDURE — 1160F RVW MEDS BY RX/DR IN RCRD: CPT | Mod: CPTII,S$GLB,, | Performed by: NURSE PRACTITIONER

## 2022-12-28 PROCEDURE — 3075F SYST BP GE 130 - 139MM HG: CPT | Mod: CPTII,S$GLB,, | Performed by: NURSE PRACTITIONER

## 2022-12-28 PROCEDURE — 90471 IMMUNIZATION ADMIN: CPT | Mod: S$GLB,,, | Performed by: NURSE PRACTITIONER

## 2022-12-28 PROCEDURE — 3060F POS MICROALBUMINURIA REV: CPT | Mod: CPTII,S$GLB,, | Performed by: NURSE PRACTITIONER

## 2022-12-28 PROCEDURE — 3075F PR MOST RECENT SYSTOLIC BLOOD PRESS GE 130-139MM HG: ICD-10-PCS | Mod: CPTII,S$GLB,, | Performed by: NURSE PRACTITIONER

## 2022-12-28 PROCEDURE — 3066F NEPHROPATHY DOC TX: CPT | Mod: CPTII,S$GLB,, | Performed by: NURSE PRACTITIONER

## 2022-12-28 PROCEDURE — 3051F PR MOST RECENT HEMOGLOBIN A1C LEVEL 7.0 - < 8.0%: ICD-10-PCS | Mod: CPTII,S$GLB,, | Performed by: NURSE PRACTITIONER

## 2022-12-28 PROCEDURE — 3008F BODY MASS INDEX DOCD: CPT | Mod: CPTII,S$GLB,, | Performed by: NURSE PRACTITIONER

## 2022-12-28 PROCEDURE — 99213 PR OFFICE/OUTPT VISIT, EST, LEVL III, 20-29 MIN: ICD-10-PCS | Mod: 25,S$GLB,, | Performed by: NURSE PRACTITIONER

## 2022-12-28 PROCEDURE — 3079F DIAST BP 80-89 MM HG: CPT | Mod: CPTII,S$GLB,, | Performed by: NURSE PRACTITIONER

## 2022-12-28 PROCEDURE — 3051F HG A1C>EQUAL 7.0%<8.0%: CPT | Mod: CPTII,S$GLB,, | Performed by: NURSE PRACTITIONER

## 2022-12-28 PROCEDURE — 3066F PR DOCUMENTATION OF TREATMENT FOR NEPHROPATHY: ICD-10-PCS | Mod: CPTII,S$GLB,, | Performed by: NURSE PRACTITIONER

## 2022-12-28 PROCEDURE — 1159F PR MEDICATION LIST DOCUMENTED IN MEDICAL RECORD: ICD-10-PCS | Mod: CPTII,S$GLB,, | Performed by: NURSE PRACTITIONER

## 2022-12-28 PROCEDURE — 90471 FLU VACCINE (QUAD) GREATER THAN OR EQUAL TO 3YO PRESERVATIVE FREE IM: ICD-10-PCS | Mod: S$GLB,,, | Performed by: NURSE PRACTITIONER

## 2022-12-28 PROCEDURE — 99213 OFFICE O/P EST LOW 20 MIN: CPT | Mod: 25,S$GLB,, | Performed by: NURSE PRACTITIONER

## 2022-12-28 RX ORDER — PEN NEEDLE, DIABETIC 29 G X1/2"
NEEDLE, DISPOSABLE MISCELLANEOUS
Qty: 100 EACH | Refills: 11 | Status: SHIPPED | OUTPATIENT
Start: 2022-12-28 | End: 2023-05-10 | Stop reason: SDUPTHER

## 2022-12-28 NOTE — PROGRESS NOTES
Subjective:       Patient ID: Tomer Boone is a 32 y.o. male.    Chief Complaint: Follow-up (Pt is here for a follow up. 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 He was previously established [facility] with endocrinology. He has a PMH of Type 1 DM. His last A1c on 11/21/22 was 7.3%.     Need  pen needle refills     Slightly tender nodule right distal radial region near wrist     for 6 weeks from his wife.   He reports oral intercourse outside of his marriage with another female. He is requesting full STI panel now that he is back with his wife. Denies rash, blisters, or penile lesions; No penile discharge.       Review of Systems   Constitutional:  Negative for chills, fatigue and fever.   Respiratory:  Negative for cough and wheezing.    Cardiovascular:  Negative for chest pain and palpitations.   Genitourinary:  Negative for hematuria, penile discharge, penile pain, scrotal swelling and testicular pain.   Skin:  Negative for color change and rash.   Neurological:  Negative for dizziness, light-headedness and headaches.         Past Medical History:  Past Medical History:   Diagnosis Date    Diabetes mellitus type I       History reviewed. No pertinent surgical history.   Review of patient's allergies indicates:  No Known Allergies   Current Outpatient Medications   Medication Sig Dispense Refill    diclofenac sodium (VOLTAREN) 1 % Gel Apply 2 g topically 4 (four) times daily. 100 g 3    FREESTYLE MAURICIO 2 READER Misc 1 Units by Misc.(Non-Drug; Combo Route) route 3 (three) times daily. 1 each 0    FREESTYLE MAURICIO 2 SENSOR Kit Use as directed 1 kit 11    HUMALOG KWIKPEN INSULIN 100 unit/mL pen Inject subcutaneously TID with meals per sliding scale:   0 units 131-180   4 units 181-240   8 units 241-300   10 units 301-350   12 units 351-400   16 units   > 400 notify PCP 16.2 mL 1    insulin glargine U-300 conc  "(TOUJEO MAX SOLOSTAR) 300 unit/mL (3 mL) insulin pen Inject 30 Units into the skin every evening. 1 pen 6    metoclopramide HCl (REGLAN) 10 MG tablet TAKE 1 TABLET BY MOUTH FOUR TIMES A DAY BEFORE MEALS AND AT BEDTIME 90 tablet 3    pen needle, diabetic 31 gauge x 1/4" Ndle Use 1 needle three times daily as directed 100 each 11     No current facility-administered medications for this visit.     Social History     Socioeconomic History    Marital status:    Tobacco Use    Smoking status: Never    Smokeless tobacco: Never   Substance and Sexual Activity    Alcohol use: Yes     Comment: socially    Drug use: Yes     Types: Marijuana    Sexual activity: Yes      History reviewed. No pertinent family history.     Objective:      Physical Exam  Constitutional:       Appearance: He is well-developed.   HENT:      Head: Normocephalic and atraumatic.      Mouth/Throat:      Mouth: Mucous membranes are moist.      Pharynx: Oropharynx is clear.   Eyes:      General: No scleral icterus.     Conjunctiva/sclera: Conjunctivae normal.   Neck:      Trachea: Trachea normal.   Pulmonary:      Effort: Pulmonary effort is normal.   Musculoskeletal:         General: Normal range of motion.      Cervical back: Normal range of motion and neck supple.      Comments: Superficial skin nodule distal radial region near wrist consistent w/ ganglion cyst; pic uploaded to chart   Skin:     General: Skin is warm and dry.   Neurological:      Mental Status: He is alert.   Psychiatric:         Speech: Speech normal.         Behavior: Behavior normal.        Assessment:     1. Type 1 diabetes mellitus without complication Stable   2. Nodule of soft tissue    3. Possible exposure to STD    4. High risk heterosexual behavior    5. Dysuria    6. Flu vaccine need      Plan:       PROBLEM LIST     Type 1 diabetes mellitus without complication  Comments:  renewing pen needles; updating flu vaccine today    Orders:  -     pen needle, diabetic 31 " "gauge x 1/4" Ndle; Use 1 needle three times daily as directed  Dispense: 100 each; Refill: 11    Nodule of soft tissue  Comments:  suspect ganglion cyst; ordering US  Orders:  -     US Extremity Non Vascular Limited Right; Future; Expected date: 12/28/2022    Possible exposure to STD  Comments:  ordering full STI panel; will contact with results  Orders:  -     HIV 1/2 Ag/Ab (4th Gen)  -     RPR  -     C. trachomatis/N. gonorrhoeae by AMP DNA Other; Urine  -     Trichomonas vaginalis, RNA, Qual, Urine    High risk heterosexual behavior  -     HIV 1/2 Ag/Ab (4th Gen)  -     RPR  -     C. trachomatis/N. gonorrhoeae by AMP DNA Other; Urine  -     Trichomonas vaginalis, RNA, Qual, Urine    Dysuria  -     Urinalysis, Reflex to Urine Culture Urine, Clean Catch    Flu vaccine need  -     Influenza - Quadrivalent *Preferred* (6 months+) (PF)        Ordering STI panel, serum & urine    Ordering US of nodule RUE; suspect ganglion cyst or lipoma   "

## 2023-05-08 ENCOUNTER — PATIENT MESSAGE (OUTPATIENT)
Dept: FAMILY MEDICINE | Facility: CLINIC | Age: 33
End: 2023-05-08
Payer: COMMERCIAL

## 2023-05-10 DIAGNOSIS — E10.9 TYPE 1 DIABETES MELLITUS WITHOUT COMPLICATION: ICD-10-CM

## 2023-05-10 DIAGNOSIS — E10.9 TYPE 1 DIABETES MELLITUS WITHOUT COMPLICATION: Chronic | ICD-10-CM

## 2023-05-10 RX ORDER — PEN NEEDLE, DIABETIC 29 G X1/2"
NEEDLE, DISPOSABLE MISCELLANEOUS
Qty: 100 EACH | Refills: 11 | Status: SHIPPED | OUTPATIENT
Start: 2023-05-10

## 2023-05-10 RX ORDER — INSULIN LISPRO 100 [IU]/ML
INJECTION, SOLUTION INTRAVENOUS; SUBCUTANEOUS
Qty: 15 EACH | Refills: 1 | Status: SHIPPED | OUTPATIENT
Start: 2023-05-10 | End: 2023-05-17 | Stop reason: SDUPTHER

## 2023-05-10 RX ORDER — FLASH GLUCOSE SENSOR
KIT MISCELLANEOUS
Qty: 1 KIT | Refills: 11 | Status: SHIPPED | OUTPATIENT
Start: 2023-05-10

## 2023-05-12 ENCOUNTER — PATIENT MESSAGE (OUTPATIENT)
Dept: FAMILY MEDICINE | Facility: CLINIC | Age: 33
End: 2023-05-12
Payer: COMMERCIAL

## 2023-05-17 ENCOUNTER — OFFICE VISIT (OUTPATIENT)
Dept: FAMILY MEDICINE | Facility: CLINIC | Age: 33
End: 2023-05-17
Payer: COMMERCIAL

## 2023-05-17 VITALS
OXYGEN SATURATION: 98 % | RESPIRATION RATE: 18 BRPM | WEIGHT: 176 LBS | SYSTOLIC BLOOD PRESSURE: 131 MMHG | DIASTOLIC BLOOD PRESSURE: 85 MMHG | HEIGHT: 67 IN | HEART RATE: 81 BPM | TEMPERATURE: 97 F | BODY MASS INDEX: 27.62 KG/M2

## 2023-05-17 DIAGNOSIS — E10.9 TYPE 1 DIABETES MELLITUS WITHOUT COMPLICATION: Primary | Chronic | ICD-10-CM

## 2023-05-17 DIAGNOSIS — Z23 NEED FOR PROPHYLACTIC VACCINATION WITH STREPTOCOCCUS PNEUMONIAE (PNEUMOCOCCUS) AND INFLUENZA VACCINES: ICD-10-CM

## 2023-05-17 LAB
CREATININE RANDOM URINE: 111.6 MG/DL (ref 39–259)
HBA1C MFR BLD: 7.6 % (ref 4–6)
MICROALBUMIN QUANT: 2 MG/DL (ref 0–2)
MICROALBUMIN/CREATININE RATIO: 17.92 UG/MG (ref 0–30)

## 2023-05-17 PROCEDURE — 83036 HEMOGLOBIN GLYCOSYLATED A1C: CPT | Mod: QW,S$GLB,, | Performed by: NURSE PRACTITIONER

## 2023-05-17 PROCEDURE — 90471 IMMUNIZATION ADMIN: CPT | Mod: S$GLB,,, | Performed by: NURSE PRACTITIONER

## 2023-05-17 PROCEDURE — 83036 PR  GLYCOSYLATED HEMOGLOBIN TEST: ICD-10-PCS | Mod: QW,S$GLB,, | Performed by: NURSE PRACTITIONER

## 2023-05-17 PROCEDURE — 3079F PR MOST RECENT DIASTOLIC BLOOD PRESSURE 80-89 MM HG: ICD-10-PCS | Mod: CPTII,S$GLB,, | Performed by: NURSE PRACTITIONER

## 2023-05-17 PROCEDURE — 3075F PR MOST RECENT SYSTOLIC BLOOD PRESS GE 130-139MM HG: ICD-10-PCS | Mod: CPTII,S$GLB,, | Performed by: NURSE PRACTITIONER

## 2023-05-17 PROCEDURE — 90471 PNEUMOCOCCAL CONJUGATE VACCINE 20-VALENT: ICD-10-PCS | Mod: S$GLB,,, | Performed by: NURSE PRACTITIONER

## 2023-05-17 PROCEDURE — 3079F DIAST BP 80-89 MM HG: CPT | Mod: CPTII,S$GLB,, | Performed by: NURSE PRACTITIONER

## 2023-05-17 PROCEDURE — 3075F SYST BP GE 130 - 139MM HG: CPT | Mod: CPTII,S$GLB,, | Performed by: NURSE PRACTITIONER

## 2023-05-17 PROCEDURE — 99213 OFFICE O/P EST LOW 20 MIN: CPT | Mod: 25,S$GLB,, | Performed by: NURSE PRACTITIONER

## 2023-05-17 PROCEDURE — 1159F MED LIST DOCD IN RCRD: CPT | Mod: CPTII,S$GLB,, | Performed by: NURSE PRACTITIONER

## 2023-05-17 PROCEDURE — 3008F PR BODY MASS INDEX (BMI) DOCUMENTED: ICD-10-PCS | Mod: CPTII,S$GLB,, | Performed by: NURSE PRACTITIONER

## 2023-05-17 PROCEDURE — 90677 PNEUMOCOCCAL CONJUGATE VACCINE 20-VALENT: ICD-10-PCS | Mod: S$GLB,,, | Performed by: NURSE PRACTITIONER

## 2023-05-17 PROCEDURE — 3008F BODY MASS INDEX DOCD: CPT | Mod: CPTII,S$GLB,, | Performed by: NURSE PRACTITIONER

## 2023-05-17 PROCEDURE — 90677 PCV20 VACCINE IM: CPT | Mod: S$GLB,,, | Performed by: NURSE PRACTITIONER

## 2023-05-17 PROCEDURE — 1160F PR REVIEW ALL MEDS BY PRESCRIBER/CLIN PHARMACIST DOCUMENTED: ICD-10-PCS | Mod: CPTII,S$GLB,, | Performed by: NURSE PRACTITIONER

## 2023-05-17 PROCEDURE — 1160F RVW MEDS BY RX/DR IN RCRD: CPT | Mod: CPTII,S$GLB,, | Performed by: NURSE PRACTITIONER

## 2023-05-17 PROCEDURE — 3051F HG A1C>EQUAL 7.0%<8.0%: CPT | Mod: CPTII,S$GLB,, | Performed by: NURSE PRACTITIONER

## 2023-05-17 PROCEDURE — 3051F PR MOST RECENT HEMOGLOBIN A1C LEVEL 7.0 - < 8.0%: ICD-10-PCS | Mod: CPTII,S$GLB,, | Performed by: NURSE PRACTITIONER

## 2023-05-17 PROCEDURE — 99213 PR OFFICE/OUTPT VISIT, EST, LEVL III, 20-29 MIN: ICD-10-PCS | Mod: 25,S$GLB,, | Performed by: NURSE PRACTITIONER

## 2023-05-17 PROCEDURE — 1159F PR MEDICATION LIST DOCUMENTED IN MEDICAL RECORD: ICD-10-PCS | Mod: CPTII,S$GLB,, | Performed by: NURSE PRACTITIONER

## 2023-05-17 RX ORDER — INSULIN GLARGINE 300 U/ML
INJECTION, SOLUTION SUBCUTANEOUS
Qty: 6 ML | Refills: 6 | Status: SHIPPED | OUTPATIENT
Start: 2023-05-17

## 2023-05-17 RX ORDER — INSULIN LISPRO 100 [IU]/ML
INJECTION, SOLUTION INTRAVENOUS; SUBCUTANEOUS
Qty: 15 EACH | Refills: 1 | Status: SHIPPED | OUTPATIENT
Start: 2023-05-17 | End: 2024-01-02 | Stop reason: SDUPTHER

## 2023-05-17 NOTE — PROGRESS NOTES
"Subjective:       Patient ID: Tomer Boone is a 32 y.o. male.    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 He lives with wife and son in [place]. He was previously established [facility] with endocrinology. He has a PMH of Type 1 DM. His last A1c on 11/21/22 was 7.3%.  He ran out of Toujeo 3 days ago. His A1c today is 7.6%; He is due for diabetic eye exam.     Review of Systems   Constitutional:  Negative for chills, fatigue and fever.   Respiratory:  Negative for cough and wheezing.    Cardiovascular:  Negative for chest pain and palpitations.   Genitourinary:  Negative for hematuria, penile discharge, penile pain, scrotal swelling and testicular pain.   Skin:  Negative for color change and rash.   Neurological:  Negative for dizziness, light-headedness and headaches.         Past Medical History:  Past Medical History:   Diagnosis Date    Diabetes mellitus type I       History reviewed. No pertinent surgical history.   Review of patient's allergies indicates:  No Known Allergies   Current Outpatient Medications   Medication Sig Dispense Refill    diclofenac sodium (VOLTAREN) 1 % Gel Apply 2 g topically 4 (four) times daily. 100 g 3    FREESTYLE MAURICIO 2 READER Misc 1 Units by Misc.(Non-Drug; Combo Route) route 3 (three) times daily. 1 each 0    FREESTYLE MAURICIO 2 SENSOR Kit Use as directed 1 kit 11    metoclopramide HCl (REGLAN) 10 MG tablet TAKE 1 TABLET BY MOUTH FOUR TIMES A DAY BEFORE MEALS AND AT BEDTIME 90 tablet 3    pen needle, diabetic 31 gauge x 1/4" Ndle Use 1 needle three times daily as directed 100 each 11    HUMALOG KWIKPEN INSULIN 100 unit/mL pen INJECT 6 UNITS INTO THE SKIN 3 TIMES DAILY WITH MEALS. 15 each 1    insulin glargine U-300 conc (TOUJEO MAX U-300 SOLOSTAR) 300 unit/mL (3 mL) insulin pen INJECT 30 UNITS INTO THE SKIN EVERY EVENING. 6 mL 6     No current facility-administered medications for this visit.     Social " History     Socioeconomic History    Marital status:    Tobacco Use    Smoking status: Never    Smokeless tobacco: Never   Substance and Sexual Activity    Alcohol use: Yes     Comment: socially    Drug use: Yes     Types: Marijuana    Sexual activity: Yes      History reviewed. No pertinent family history.     Objective:      Physical Exam  Constitutional:       Appearance: He is well-developed.   HENT:      Head: Normocephalic and atraumatic.      Mouth/Throat:      Mouth: Mucous membranes are moist.      Pharynx: Oropharynx is clear.   Eyes:      General: No scleral icterus.     Conjunctiva/sclera: Conjunctivae normal.   Neck:      Trachea: Trachea normal.   Pulmonary:      Effort: Pulmonary effort is normal.   Musculoskeletal:         General: Normal range of motion.      Cervical back: Normal range of motion and neck supple.      Comments: Superficial skin nodule distal radial region near wrist consistent w/ ganglion cyst; pic uploaded to chart   Skin:     General: Skin is warm and dry.   Neurological:      Mental Status: He is alert.   Psychiatric:         Speech: Speech normal.         Behavior: Behavior normal.       Protective Sensation (w/ 10 gram monofilament):  Right: Intact  Left: Intact    Visual Inspection:  Callus -  Right -- base of Rt great toe; normal nails; no ulcerations    Pedal Pulses:   Right: Present  Left: Present    Posterior Tibialis Pulses:   Right:Present  Left: Present   Assessment:     1. Type 1 diabetes mellitus without complication Stable   2. Need for prophylactic vaccination with Streptococcus pneumoniae (Pneumococcus) and Influenza vaccines      Plan:       PROBLEM LIST     Type 1 diabetes mellitus without complication  Comments:  need to buy new shoes to better distribute weight, especially on Rt foot. A1c 7.6% today. Sending referral for diabetic eye exam. check urine microalbumin today  Orders:  -     Hemoglobin A1C, POCT  -     Ambulatory referral/consult to  Ophthalmology; Future; Expected date: 05/24/2023  -     insulin glargine U-300 conc (TOUJEO MAX U-300 SOLOSTAR) 300 unit/mL (3 mL) insulin pen; INJECT 30 UNITS INTO THE SKIN EVERY EVENING.  Dispense: 6 mL; Refill: 6  -     HUMALOG KWIKPEN INSULIN 100 unit/mL pen; INJECT 6 UNITS INTO THE SKIN 3 TIMES DAILY WITH MEALS.  Dispense: 15 each; Refill: 1  -     Microalbumin/Creatinine Ratio, urine    Need for prophylactic vaccination with Streptococcus pneumoniae (Pneumococcus) and Influenza vaccines  -     (In Office Administered) Pneumococcal Conjugate Vaccine (20 Valent) (IM)        Callus Right foot great toe--need new shoes to better distribute weight.     Diabetic eye exam referral sent to [facility]    Obtaining urine microalbumin    A1c 7.6%; renewing insulin

## 2023-06-01 LAB
LEFT EYE DM RETINOPATHY: NEGATIVE
RIGHT EYE DM RETINOPATHY: NEGATIVE

## 2023-06-13 ENCOUNTER — PATIENT OUTREACH (OUTPATIENT)
Dept: ADMINISTRATIVE | Facility: HOSPITAL | Age: 33
End: 2023-06-13
Payer: COMMERCIAL

## 2023-07-21 ENCOUNTER — PATIENT MESSAGE (OUTPATIENT)
Dept: FAMILY MEDICINE | Facility: CLINIC | Age: 33
End: 2023-07-21
Payer: COMMERCIAL

## 2023-07-22 RX ORDER — FLUCONAZOLE 150 MG/1
150 TABLET ORAL
Qty: 2 TABLET | Refills: 0 | Status: SHIPPED | OUTPATIENT
Start: 2023-07-22

## 2023-07-24 ENCOUNTER — OFFICE VISIT (OUTPATIENT)
Dept: FAMILY MEDICINE | Facility: CLINIC | Age: 33
End: 2023-07-24
Payer: COMMERCIAL

## 2023-07-24 VITALS
RESPIRATION RATE: 18 BRPM | SYSTOLIC BLOOD PRESSURE: 134 MMHG | TEMPERATURE: 98 F | HEART RATE: 68 BPM | DIASTOLIC BLOOD PRESSURE: 83 MMHG | BODY MASS INDEX: 25.74 KG/M2 | WEIGHT: 164 LBS | HEIGHT: 67 IN | OXYGEN SATURATION: 98 %

## 2023-07-24 DIAGNOSIS — E10.9 TYPE 1 DIABETES MELLITUS WITHOUT COMPLICATION: Chronic | ICD-10-CM

## 2023-07-24 DIAGNOSIS — B37.42 CANDIDAL BALANITIS: Primary | ICD-10-CM

## 2023-07-24 PROCEDURE — 3075F PR MOST RECENT SYSTOLIC BLOOD PRESS GE 130-139MM HG: ICD-10-PCS | Mod: CPTII,S$GLB,, | Performed by: NURSE PRACTITIONER

## 2023-07-24 PROCEDURE — 3066F PR DOCUMENTATION OF TREATMENT FOR NEPHROPATHY: ICD-10-PCS | Mod: CPTII,S$GLB,, | Performed by: NURSE PRACTITIONER

## 2023-07-24 PROCEDURE — 1160F PR REVIEW ALL MEDS BY PRESCRIBER/CLIN PHARMACIST DOCUMENTED: ICD-10-PCS | Mod: CPTII,S$GLB,, | Performed by: NURSE PRACTITIONER

## 2023-07-24 PROCEDURE — 3061F NEG MICROALBUMINURIA REV: CPT | Mod: CPTII,S$GLB,, | Performed by: NURSE PRACTITIONER

## 2023-07-24 PROCEDURE — 3066F NEPHROPATHY DOC TX: CPT | Mod: CPTII,S$GLB,, | Performed by: NURSE PRACTITIONER

## 2023-07-24 PROCEDURE — 99213 PR OFFICE/OUTPT VISIT, EST, LEVL III, 20-29 MIN: ICD-10-PCS | Mod: S$GLB,,, | Performed by: NURSE PRACTITIONER

## 2023-07-24 PROCEDURE — 3008F BODY MASS INDEX DOCD: CPT | Mod: CPTII,S$GLB,, | Performed by: NURSE PRACTITIONER

## 2023-07-24 PROCEDURE — 3051F PR MOST RECENT HEMOGLOBIN A1C LEVEL 7.0 - < 8.0%: ICD-10-PCS | Mod: CPTII,S$GLB,, | Performed by: NURSE PRACTITIONER

## 2023-07-24 PROCEDURE — 99213 OFFICE O/P EST LOW 20 MIN: CPT | Mod: S$GLB,,, | Performed by: NURSE PRACTITIONER

## 2023-07-24 PROCEDURE — 1159F MED LIST DOCD IN RCRD: CPT | Mod: CPTII,S$GLB,, | Performed by: NURSE PRACTITIONER

## 2023-07-24 PROCEDURE — 3051F HG A1C>EQUAL 7.0%<8.0%: CPT | Mod: CPTII,S$GLB,, | Performed by: NURSE PRACTITIONER

## 2023-07-24 PROCEDURE — 3075F SYST BP GE 130 - 139MM HG: CPT | Mod: CPTII,S$GLB,, | Performed by: NURSE PRACTITIONER

## 2023-07-24 PROCEDURE — 3008F PR BODY MASS INDEX (BMI) DOCUMENTED: ICD-10-PCS | Mod: CPTII,S$GLB,, | Performed by: NURSE PRACTITIONER

## 2023-07-24 PROCEDURE — 1160F RVW MEDS BY RX/DR IN RCRD: CPT | Mod: CPTII,S$GLB,, | Performed by: NURSE PRACTITIONER

## 2023-07-24 PROCEDURE — 1159F PR MEDICATION LIST DOCUMENTED IN MEDICAL RECORD: ICD-10-PCS | Mod: CPTII,S$GLB,, | Performed by: NURSE PRACTITIONER

## 2023-07-24 PROCEDURE — 3061F PR NEG MICROALBUMINURIA RESULT DOCUMENTED/REVIEW: ICD-10-PCS | Mod: CPTII,S$GLB,, | Performed by: NURSE PRACTITIONER

## 2023-07-24 PROCEDURE — 3079F PR MOST RECENT DIASTOLIC BLOOD PRESSURE 80-89 MM HG: ICD-10-PCS | Mod: CPTII,S$GLB,, | Performed by: NURSE PRACTITIONER

## 2023-07-24 PROCEDURE — 3079F DIAST BP 80-89 MM HG: CPT | Mod: CPTII,S$GLB,, | Performed by: NURSE PRACTITIONER

## 2023-07-24 RX ORDER — CLOTRIMAZOLE 1 %
CREAM (GRAM) TOPICAL 2 TIMES DAILY
Qty: 28 G | Refills: 1 | Status: SHIPPED | OUTPATIENT
Start: 2023-07-24

## 2023-07-24 NOTE — PROGRESS NOTES
"Subjective:       Patient ID: Tomer Boone is a 32 y.o. male.    Chief Complaint: Follow-up (Pt is here for inflamed foreskin on his penis. )    He is here for new complaint--inflammed penile foreskin. His last A1c in May 2023 was 7.5%. He believes rash may have been transferred by wife who was treated of yeast inf last mo.     Review of Systems   Constitutional:  Negative for chills and fever.   Musculoskeletal:  Negative for myalgias.   Skin:  Positive for rash.   Neurological:  Negative for dizziness and light-headedness.   Hematological:  Negative for adenopathy.         Past Medical History:  Past Medical History:   Diagnosis Date    Diabetes mellitus type I       History reviewed. No pertinent surgical history.   Review of patient's allergies indicates:  No Known Allergies   Current Outpatient Medications   Medication Sig Dispense Refill    diclofenac sodium (VOLTAREN) 1 % Gel Apply 2 g topically 4 (four) times daily. 100 g 3    fluconazole (DIFLUCAN) 150 MG Tab Take 1 tablet (150 mg total) by mouth every 72 hours. 2 tablet 0    FREESTYLE MAURICIO 2 READER Misc 1 Units by Misc.(Non-Drug; Combo Route) route 3 (three) times daily. 1 each 0    FREESTYLE MAURICIO 2 SENSOR Kit Use as directed 1 kit 11    HUMALOG KWIKPEN INSULIN 100 unit/mL pen INJECT 6 UNITS INTO THE SKIN 3 TIMES DAILY WITH MEALS. 15 each 1    insulin glargine U-300 conc (TOUJEO MAX U-300 SOLOSTAR) 300 unit/mL (3 mL) insulin pen INJECT 30 UNITS INTO THE SKIN EVERY EVENING. 6 mL 6    metoclopramide HCl (REGLAN) 10 MG tablet TAKE 1 TABLET BY MOUTH FOUR TIMES A DAY BEFORE MEALS AND AT BEDTIME 90 tablet 3    pen needle, diabetic 31 gauge x 1/4" Ndle Use 1 needle three times daily as directed 100 each 11    clotrimazole (LOTRIMIN) 1 % cream Apply topically 2 (two) times daily. 28 g 1     No current facility-administered medications for this visit.     Social History     Socioeconomic History    Marital status:    Tobacco Use    Smoking status: Never "    Smokeless tobacco: Never   Substance and Sexual Activity    Alcohol use: Yes     Comment: socially    Drug use: Yes     Types: Marijuana    Sexual activity: Yes      History reviewed. No pertinent family history.     Objective:      Physical Exam  Constitutional:       Appearance: Normal appearance.   HENT:      Head: Normocephalic.   Pulmonary:      Effort: Pulmonary effort is normal.   Skin:     Findings: Rash (redness swelling penile head, no discharge) present.   Neurological:      Mental Status: He is alert and oriented to person, place, and time.   Psychiatric:         Mood and Affect: Mood normal.         Behavior: Behavior normal.       Assessment:     1. Candidal balanitis Acute   2. Type 1 diabetes mellitus without complication Stable     Plan:       PROBLEM LIST     Candidal balanitis  Comments:  start clotrimazole BID and bacitracin daily. Call or RTC for persistent or worsening symptoms  Orders:  -     clotrimazole (LOTRIMIN) 1 % cream; Apply topically 2 (two) times daily.  Dispense: 28 g; Refill: 1    Type 1 diabetes mellitus without complication  Comments:  repeat A1c Nov 2023

## 2023-10-24 ENCOUNTER — PATIENT MESSAGE (OUTPATIENT)
Dept: FAMILY MEDICINE | Facility: CLINIC | Age: 33
End: 2023-10-24
Payer: COMMERCIAL

## 2023-12-20 ENCOUNTER — PATIENT MESSAGE (OUTPATIENT)
Dept: ADMINISTRATIVE | Facility: HOSPITAL | Age: 33
End: 2023-12-20
Payer: COMMERCIAL

## 2024-01-02 DIAGNOSIS — E10.9 TYPE 1 DIABETES MELLITUS WITHOUT COMPLICATION: Chronic | ICD-10-CM

## 2024-01-02 RX ORDER — PEN NEEDLE, DIABETIC 29 G X1/2"
NEEDLE, DISPOSABLE MISCELLANEOUS
Qty: 100 EACH | Refills: 11 | Status: CANCELLED | OUTPATIENT
Start: 2024-01-02

## 2024-01-03 RX ORDER — PEN NEEDLE, DIABETIC 30 GX3/16"
NEEDLE, DISPOSABLE MISCELLANEOUS
Qty: 200 EACH | Refills: 5 | Status: SHIPPED | OUTPATIENT
Start: 2024-01-03

## 2024-01-03 RX ORDER — INSULIN LISPRO 100 [IU]/ML
INJECTION, SOLUTION INTRAVENOUS; SUBCUTANEOUS
Qty: 15 EACH | Refills: 1 | Status: SHIPPED | OUTPATIENT
Start: 2024-01-03

## 2024-02-23 ENCOUNTER — PATIENT MESSAGE (OUTPATIENT)
Dept: FAMILY MEDICINE | Facility: CLINIC | Age: 34
End: 2024-02-23

## 2024-02-23 DIAGNOSIS — E10.9 TYPE 1 DIABETES MELLITUS WITHOUT COMPLICATION: Chronic | ICD-10-CM

## 2024-02-27 RX ORDER — INSULIN LISPRO 100 [IU]/ML
INJECTION, SOLUTION INTRAVENOUS; SUBCUTANEOUS
Qty: 15 EACH | Refills: 1 | Status: SHIPPED | OUTPATIENT
Start: 2024-02-27 | End: 2024-06-06 | Stop reason: SDUPTHER

## 2024-03-20 ENCOUNTER — PATIENT MESSAGE (OUTPATIENT)
Dept: ADMINISTRATIVE | Facility: HOSPITAL | Age: 34
End: 2024-03-20
Payer: COMMERCIAL

## 2024-04-11 ENCOUNTER — OFFICE VISIT (OUTPATIENT)
Dept: FAMILY MEDICINE | Facility: CLINIC | Age: 34
End: 2024-04-11
Payer: COMMERCIAL

## 2024-04-11 ENCOUNTER — TELEPHONE (OUTPATIENT)
Dept: FAMILY MEDICINE | Facility: CLINIC | Age: 34
End: 2024-04-11

## 2024-04-11 VITALS
WEIGHT: 174 LBS | DIASTOLIC BLOOD PRESSURE: 80 MMHG | RESPIRATION RATE: 15 BRPM | TEMPERATURE: 99 F | SYSTOLIC BLOOD PRESSURE: 120 MMHG | BODY MASS INDEX: 27.31 KG/M2 | OXYGEN SATURATION: 98 % | HEART RATE: 96 BPM | HEIGHT: 67 IN

## 2024-04-11 DIAGNOSIS — E11.43 DIABETIC GASTROPARESIS: Chronic | ICD-10-CM

## 2024-04-11 DIAGNOSIS — E10.65 TYPE 1 DIABETES MELLITUS WITH HYPERGLYCEMIA: Primary | Chronic | ICD-10-CM

## 2024-04-11 DIAGNOSIS — K31.84 DIABETIC GASTROPARESIS: Chronic | ICD-10-CM

## 2024-04-11 LAB
ALBUMIN SERPL BCP-MCNC: 3.9 G/DL (ref 3.4–5)
ALP SERPL-CCNC: 71 U/L (ref 45–117)
ALT SERPL W P-5'-P-CCNC: 33 U/L (ref 16–61)
ANION GAP SERPL CALC-SCNC: 5 MMOL/L (ref 3–11)
AST SERPL-CCNC: 16 U/L (ref 15–37)
BANDS: 1 % (ref 2–6)
BILIRUB SERPL-MCNC: 0.6 MG/DL (ref 0.2–1)
BUN SERPL-MCNC: 16 MG/DL (ref 7–18)
BUN/CREAT SERPL: 13 RATIO
CALCIUM SERPL-MCNC: 9.7 MG/DL (ref 8.5–10.1)
CELLS COUNTED: 100
CHLORIDE SERPL-SCNC: 99 MMOL/L (ref 98–107)
CHOLEST SERPL-MSCNC: 180 MG/DL
CO2 SERPL-SCNC: 28 MMOL/L (ref 21–32)
CREAT SERPL-MCNC: 1.23 MG/DL (ref 0.7–1.3)
ERYTHROCYTE [DISTWIDTH] IN BLOOD BY AUTOMATED COUNT: 13.3 % (ref 0–15.5)
GFR ESTIMATION: > 60
GLUCOSE SERPL-MCNC: 250 MG/DL (ref 74–106)
GRANULOCYTES: 17.2 10*3/UL (ref 1.4–7)
HBA1C MFR BLD: 7.6 %
HCT VFR BLD AUTO: 43.5 % (ref 42–52)
HDLC SERPL-MCNC: 93 MG/DL
HGB BLD-MCNC: 14.4 G/DL (ref 14–18)
LDLC SERPL CALC-MCNC: 75.2 MG/DL
LYMPHOCYTES NFR BLD: 13 % (ref 20–45)
MCH RBC QN AUTO: 30.1 PG (ref 27–32)
MCHC RBC AUTO-ENTMCNC: 33.1 % (ref 32–36)
MCV RBC AUTO: 91 FL (ref 80–97)
MONOCYTES NFR BLD: 11 % (ref 2–10)
NEUTROPHILS NFR BLD: 74 % (ref 50–80)
NUCLEATED RBC-MANUAL: 0 /100 WBC
PLATELET MORPHOLOGY: NORMAL
PLATELETS: 405 10*3/UL (ref 130–400)
PMV BLD AUTO: 10.4 FL (ref 9.2–12.2)
POTASSIUM SERPL-SCNC: 3.8 MMOL/L (ref 3.5–5.1)
PROT SERPL-MCNC: 7.8 G/DL (ref 6.4–8.2)
RBC # BLD AUTO: 4.78 10*6/UL (ref 4.7–6.1)
RBC MORPH BLD: ABNORMAL
REACTIVE LYMPHOCYTES: 1 % (ref 0–5)
SMALL PLATELETS BLD QL SMEAR: NORMAL
SODIUM BLD-SCNC: 132 MMOL/L (ref 131–143)
T4 FREE SP9 P CHAL SERPL-SCNC: 0.9 NG/DL (ref 0.76–1.46)
TRIGL SERPL-MCNC: 59 MG/DL (ref 0–149)
TSH SERPL DL<=0.005 MIU/L-ACNC: 0.56 UIU/ML (ref 0.36–3.74)
VLDL CHOLESTEROL: 12 MG/DL
WBC # BLD: 22.9 10*3/UL (ref 4.5–10)

## 2024-04-11 PROCEDURE — 83036 HEMOGLOBIN GLYCOSYLATED A1C: CPT | Mod: QW,S$GLB,, | Performed by: NURSE PRACTITIONER

## 2024-04-11 PROCEDURE — 3008F BODY MASS INDEX DOCD: CPT | Mod: CPTII,S$GLB,, | Performed by: NURSE PRACTITIONER

## 2024-04-11 PROCEDURE — 3074F SYST BP LT 130 MM HG: CPT | Mod: CPTII,S$GLB,, | Performed by: NURSE PRACTITIONER

## 2024-04-11 PROCEDURE — 3051F HG A1C>EQUAL 7.0%<8.0%: CPT | Mod: CPTII,S$GLB,, | Performed by: NURSE PRACTITIONER

## 2024-04-11 PROCEDURE — 99214 OFFICE O/P EST MOD 30 MIN: CPT | Mod: 25,S$GLB,, | Performed by: NURSE PRACTITIONER

## 2024-04-11 PROCEDURE — 3079F DIAST BP 80-89 MM HG: CPT | Mod: CPTII,S$GLB,, | Performed by: NURSE PRACTITIONER

## 2024-04-11 PROCEDURE — 1159F MED LIST DOCD IN RCRD: CPT | Mod: CPTII,S$GLB,, | Performed by: NURSE PRACTITIONER

## 2024-04-11 PROCEDURE — 1160F RVW MEDS BY RX/DR IN RCRD: CPT | Mod: CPTII,S$GLB,, | Performed by: NURSE PRACTITIONER

## 2024-04-11 RX ORDER — BLOOD-GLUCOSE SENSOR
EACH MISCELLANEOUS
Qty: 3 EACH | Refills: 11 | Status: SHIPPED | OUTPATIENT
Start: 2024-04-11

## 2024-04-11 RX ORDER — METOCLOPRAMIDE 10 MG/1
TABLET ORAL
Qty: 120 TABLET | Refills: 3 | Status: SHIPPED | OUTPATIENT
Start: 2024-04-11 | End: 2024-04-11

## 2024-04-11 RX ORDER — METOCLOPRAMIDE 10 MG/1
TABLET ORAL
Qty: 120 TABLET | Refills: 3 | Status: SHIPPED | OUTPATIENT
Start: 2024-04-11

## 2024-04-11 NOTE — PROGRESS NOTES
"Subjective:       Patient ID: Tomer Boone is a 33 y.o. male.    Chief Complaint: Follow-up (Pt. Here for check up.  POCT A1C done today in office.  C/o Intermittent N&V and Diarrhea x 2 months.  C/o painful callous on Rt. 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 He lives with wife and son in [place]. They are having their 2nd child in Oct 2024. He was previously established [facility] with endocrinology. He is requesting to see a different endocrinologist. He has a PMH of Type 1 DM. His last A1c May 2023 was 7.6%.  His A1c today is 7.6%; He is due for his next diabetic eye exam June 2024. He reports exacerbations of diabetic gastroparesis of late---N/V/D; He is needing refills of Dexcom G7 sensors.       Review of Systems   Constitutional:  Negative for chills, fatigue and fever.   Respiratory:  Negative for cough and wheezing.    Cardiovascular:  Negative for chest pain and palpitations.   Gastrointestinal:  Positive for abdominal pain, diarrhea, nausea and vomiting. Negative for blood in stool and constipation.   Skin:  Negative for color change and rash.   Neurological:  Negative for dizziness, light-headedness and headaches.           Past Medical History:  Past Medical History:   Diagnosis Date    Diabetes mellitus type I       History reviewed. No pertinent surgical history.   Review of patient's allergies indicates:   Allergen Reactions    Iodine Other (See Comments)      Current Outpatient Medications   Medication Sig Dispense Refill    diclofenac sodium (VOLTAREN) 1 % Gel Apply 2 g topically 4 (four) times daily. 100 g 3    HUMALOG KWIKPEN INSULIN 100 unit/mL pen INJECT 6 UNITS INTO THE SKIN 3 TIMES DAILY WITH MEALS. 15 each 1    insulin glargine U-300 conc (TOUJEO MAX U-300 SOLOSTAR) 300 unit/mL (3 mL) insulin pen INJECT 30 UNITS INTO THE SKIN EVERY EVENING. 6 mL 6    pen needle, diabetic (BD ULTRA-FINE SHORT PEN NEEDLE) 31 gauge x 5/16" Ndle Use 1 needle three times " "daily as directed 200 each 5    pen needle, diabetic 31 gauge x 1/4" Ndle Use 1 needle three times daily as directed 100 each 11    DEXCOM G7 SENSOR Elisabet Use as directed; apply 1 sensor q 10 d 3 each 11    metoclopramide HCl (REGLAN) 10 MG tablet TAKE 1 TABLET BY MOUTH FOUR TIMES A DAY BEFORE MEALS AND AT BEDTIME 120 tablet 3     No current facility-administered medications for this visit.     Social History     Socioeconomic History    Marital status:    Tobacco Use    Smoking status: Former     Types: Cigarettes     Passive exposure: Current    Smokeless tobacco: Never    Tobacco comments:     Works at [employer]   Substance and Sexual Activity    Alcohol use: Yes     Comment: socially    Drug use: Yes     Types: Marijuana     Comment: "medical"    Sexual activity: Yes      History reviewed. No pertinent family history.     Objective:      Physical Exam  Constitutional:       Appearance: He is well-developed.   HENT:      Head: Normocephalic and atraumatic.      Mouth/Throat:      Mouth: Mucous membranes are moist.      Pharynx: Oropharynx is clear.   Eyes:      General: No scleral icterus.     Conjunctiva/sclera: Conjunctivae normal.   Neck:      Trachea: Trachea normal.   Pulmonary:      Effort: Pulmonary effort is normal.   Abdominal:      General: Bowel sounds are normal.      Palpations: Abdomen is soft.   Musculoskeletal:      Cervical back: Normal range of motion and neck supple.   Neurological:      Mental Status: He is alert and oriented to person, place, and time.   Psychiatric:         Mood and Affect: Mood normal.         Speech: Speech normal.         Behavior: Behavior normal.         Assessment:     1. Type 1 diabetes mellitus with hyperglycemia Active   2. Diabetic gastroparesis Active     Plan:       PROBLEM LIST     Type 1 diabetes mellitus with hyperglycemia  Comments:  A1c 7.6% today; sending referral to endocrinology given frequent recurrence diabetic gastroparesis  Orders:  -     " Hemoglobin A1C, POCT  -     CBC Auto Differential  -     Comprehensive Metabolic Panel  -     Lipid Panel  -     TSH w/reflex to FT4  -     Ambulatory referral/consult to Endocrinology; Future; Expected date: 04/18/2024  -     DEXCOM G7 SENSOR Elisabet; Use as directed; apply 1 sensor q 10 d  Dispense: 3 each; Refill: 11    Diabetic gastroparesis  Comments:  renewing Reglan for prn use  Orders:  -     Ambulatory referral/consult to Endocrinology; Future; Expected date: 04/18/2024  -     Discontinue: metoclopramide HCl (REGLAN) 10 MG tablet; TAKE 1 TABLET BY MOUTH FOUR TIMES A DAY BEFORE MEALS AND AT BEDTIME  Dispense: 120 tablet; Refill: 3  -     metoclopramide HCl (REGLAN) 10 MG tablet; TAKE 1 TABLET BY MOUTH FOUR TIMES A DAY BEFORE MEALS AND AT BEDTIME  Dispense: 120 tablet; Refill: 3

## 2024-04-11 NOTE — TELEPHONE ENCOUNTER
----- Message from Malorie Villegas sent at 4/11/2024 10:34 AM CDT -----  Regarding: orders  Gerri briones/[facility] calling for A1C orders for pt and pt is there now.  She can be reached at [phone] and fax number [phone].    Thanks,

## 2024-04-16 ENCOUNTER — TELEPHONE (OUTPATIENT)
Dept: FAMILY MEDICINE | Facility: CLINIC | Age: 34
End: 2024-04-16
Payer: COMMERCIAL

## 2024-04-16 NOTE — TELEPHONE ENCOUNTER
Advised pt. His [facility] paperwork is filled out and will be at  for p/u.  Copy filed in pts media.

## 2024-04-18 DIAGNOSIS — K31.84 DIABETIC GASTROPARESIS: ICD-10-CM

## 2024-04-18 DIAGNOSIS — E11.43 DIABETIC GASTROPARESIS: ICD-10-CM

## 2024-04-18 DIAGNOSIS — E10.69 TYPE 1 DIABETES MELLITUS WITH OTHER SPECIFIED COMPLICATION: Primary | ICD-10-CM

## 2024-04-18 LAB
ABS NRBC COUNT: 0 THOU/UL (ref 0–0.01)
ABSOLUTE BASOPHIL: 0.1 10*3/UL (ref 0–0.3)
ABSOLUTE EOSINOPHIL: 0.2 10*3/UL (ref 0–0.6)
ABSOLUTE IMMATURE GRAN: 0.02 THOU/UL (ref 0–0.03)
ABSOLUTE LYMPHOCYTE: 5.2 10*3/UL (ref 1.2–4)
ABSOLUTE MONOCYTE: 0.9 10*3/UL (ref 0.1–0.8)
BASOPHILS NFR BLD: 0.5 % (ref 0–3)
EOSINOPHIL NFR BLD: 1.6 % (ref 0–6)
ERYTHROCYTE [DISTWIDTH] IN BLOOD BY AUTOMATED COUNT: 13.7 % (ref 0–15.5)
HCT VFR BLD AUTO: 36.9 % (ref 42–52)
HGB BLD-MCNC: 11.8 G/DL (ref 14–18)
IMMATURE GRANULOCYTES: 0.2 % (ref 0–0.43)
LYMPHOCYTES NFR BLD: 52.1 % (ref 20–45)
MCH RBC QN AUTO: 29.9 PG (ref 27–32)
MCHC RBC AUTO-ENTMCNC: 32 % (ref 32–36)
MCV RBC AUTO: 93.7 FL (ref 80–97)
MONOCYTES NFR BLD: 9.4 % (ref 2–10)
NEUTROPHILS # BLD AUTO: 3.6 10*3/UL (ref 1.4–7)
NEUTROPHILS NFR BLD: 36.2 % (ref 50–80)
NUCLEATED RED BLOOD CELLS: 0 % (ref 0–0.2)
PERIPHERAL SMEAR: NORMAL
PLATELETS: 316 10*3/UL (ref 130–400)
PMV BLD AUTO: 10.3 FL (ref 9.2–12.2)
RBC # BLD AUTO: 3.94 10*6/UL (ref 4.7–6.1)
WBC # BLD: 10 10*3/UL (ref 4.5–10)

## 2024-06-06 DIAGNOSIS — E10.9 TYPE 1 DIABETES MELLITUS WITHOUT COMPLICATION: Chronic | ICD-10-CM

## 2024-06-07 RX ORDER — INSULIN LISPRO 100 [IU]/ML
INJECTION, SOLUTION INTRAVENOUS; SUBCUTANEOUS
Qty: 15 EACH | Refills: 1 | Status: SHIPPED | OUTPATIENT
Start: 2024-06-07

## 2024-07-12 DIAGNOSIS — E10.9 TYPE 1 DIABETES MELLITUS WITHOUT COMPLICATION: Chronic | ICD-10-CM

## 2024-07-12 RX ORDER — INSULIN GLARGINE 300 U/ML
INJECTION, SOLUTION SUBCUTANEOUS
Refills: 6 | OUTPATIENT
Start: 2024-07-12

## 2024-07-12 RX ORDER — INSULIN GLARGINE 300 [IU]/ML
30 INJECTION, SOLUTION SUBCUTANEOUS NIGHTLY
Qty: 9 ML | Refills: 3 | Status: SHIPPED | OUTPATIENT
Start: 2024-07-12 | End: 2025-07-12

## 2024-07-28 DIAGNOSIS — E10.9 TYPE 1 DIABETES MELLITUS WITHOUT COMPLICATION: Chronic | ICD-10-CM

## 2024-07-29 RX ORDER — INSULIN LISPRO 100 [IU]/ML
INJECTION, SOLUTION INTRAVENOUS; SUBCUTANEOUS
Qty: 15 EACH | Refills: 1 | OUTPATIENT
Start: 2024-07-29

## 2024-07-30 RX ORDER — INSULIN LISPRO 100 [IU]/ML
INJECTION, SOLUTION INTRAVENOUS; SUBCUTANEOUS
Qty: 15 EACH | Refills: 1 | Status: SHIPPED | OUTPATIENT
Start: 2024-07-30

## 2024-11-07 ENCOUNTER — OFFICE VISIT (OUTPATIENT)
Dept: FAMILY MEDICINE | Facility: CLINIC | Age: 34
End: 2024-11-07
Payer: COMMERCIAL

## 2024-11-07 VITALS
SYSTOLIC BLOOD PRESSURE: 134 MMHG | OXYGEN SATURATION: 99 % | HEIGHT: 67 IN | HEART RATE: 75 BPM | DIASTOLIC BLOOD PRESSURE: 94 MMHG | WEIGHT: 177 LBS | BODY MASS INDEX: 27.78 KG/M2

## 2024-11-07 DIAGNOSIS — E10.9 TYPE 1 DIABETES MELLITUS WITHOUT COMPLICATION: Primary | ICD-10-CM

## 2024-11-07 DIAGNOSIS — R03.0 ELEVATED BP WITHOUT DIAGNOSIS OF HYPERTENSION: ICD-10-CM

## 2024-11-07 DIAGNOSIS — R45.89 DYSPHORIC MOOD: ICD-10-CM

## 2024-11-07 LAB — HBA1C MFR BLD: 6.7 %

## 2024-11-07 RX ORDER — GLUCAGON HCL 1 MG
1 VIAL (EA) INJECTION
Qty: 1 EACH | Refills: 1 | Status: SHIPPED | OUTPATIENT
Start: 2024-11-07

## 2024-11-07 NOTE — PROGRESS NOTES
Patient ID: Tomer Boone  MRN: [id number]      History of Present Illness:  The patient is a 34 y.o. Black or  male who presents to clinic for [place] paperwork to be completed. Mr. Boone is a very pleasant young man who was diagnosed with Type 1 DM at the age of 10 years. He has not been on a pump and has been seen in the [facility] in the past and was seeing ZORAIDA Cruz who has now left that practice. He said he does have an apt scheduled with Dr. Gross in January. He and his wife have a 4 year old and they have just had a new baby. He said he has felt a little stressed and overwhelmed and was recently diagnosed with Gastroparesis and has stomach issues secondary to this. He said that and just dealing with work, new life changes and his chronic disease has him feeling a little overwhelmed and he notes his mood is affected at times. He finds himself being a little short tempered at times.     He does not want to take any medication at this time but said he could probably benefit from some counseling, referral placed today.     Allergies: Patient is allergic to iodine.     Smoking status:  Social History     Tobacco Use   Smoking Status Former    Types: Cigarettes    Passive exposure: Current   Smokeless Tobacco Never   Tobacco Comments    Works at [employer]       Problem List:  Patient Active Problem List   Diagnosis    Type 1 diabetes mellitus    Diabetic gastroparesis        Current Medications:  Current Outpatient Medications   Medication Instructions    DEXCOM G7 SENSOR Elisabet Use as directed; apply 1 sensor q 10 d    diclofenac sodium (VOLTAREN) 2 g, Topical (Top), 4 times daily    HUMALOG KWIKPEN INSULIN 100 unit/mL pen INJECT 6 UNITS INTO THE SKIN 3 TIMES DAILY WITH MEALS.    insulin glargine U-300 conc (TOUJEO MAX U-300 SOLOSTAR) 30 Units, Subcutaneous, Nightly, INJECT 30 UNITS INTO THE SKIN EVERY EVENING.    metoclopramide HCl (REGLAN) 10 MG tablet TAKE 1 TABLET BY MOUTH FOUR  "TIMES A DAY BEFORE MEALS AND AT BEDTIME    pen needle, diabetic (BD ULTRA-FINE SHORT PEN NEEDLE) 31 gauge x 5/16" Ndle Use 1 needle three times daily as directed    pen needle, diabetic 31 gauge x 1/4" Ndle Use 1 needle three times daily as directed             Review of Systems   Constitutional: Negative.    HENT: Negative.     Eyes: Negative.    Respiratory: Negative.     Cardiovascular: Negative.    Gastrointestinal: Negative.    Endocrine: Negative.    Genitourinary: Negative.    Musculoskeletal: Negative.    Integumentary:  Negative.   Allergic/Immunologic: Negative.    Neurological: Negative.    Hematological: Negative.    Psychiatric/Behavioral:  Positive for dysphoric mood.         Visit Vitals  BP (!) 134/94 (BP Location: Right arm, Patient Position: Sitting)   Pulse 75   Ht 5' 7" (1.702 m)   Wt 80.3 kg (177 lb)   SpO2 99%   BMI 27.72 kg/m²       Physical Exam  Vitals and nursing note reviewed.   Constitutional:       Appearance: Normal appearance.   HENT:      Head: Normocephalic.      Nose: Nose normal.      Mouth/Throat:      Mouth: Mucous membranes are moist.   Eyes:      Conjunctiva/sclera: Conjunctivae normal.   Cardiovascular:      Rate and Rhythm: Normal rate and regular rhythm.   Pulmonary:      Effort: Pulmonary effort is normal.      Breath sounds: Normal breath sounds.   Abdominal:      Palpations: Abdomen is soft.   Musculoskeletal:         General: Normal range of motion.      Cervical back: Normal range of motion.   Skin:     General: Skin is warm and dry.   Neurological:      General: No focal deficit present.      Mental Status: He is alert.   Psychiatric:         Mood and Affect: Mood normal.         Behavior: Behavior normal.          Assessment & Plan:  1. Type 1 diabetes mellitus without complication  -     Hemoglobin A1C, POCT  Reviewed A1c with Tomer which as 6.7 % . He wears a sensor and notes he has very few low blood sugars but will sometimes have some higher ones in the 200's. He " currently  uses basal/bolus insulin and administers 1 unit of insulin for every 15 grams of carbs and administers long acting basal insulin at night. He said it has been several years since he was hospitalized for low bs and has not been hospitalized for DKA since 2015. Overall he has been well controlled and keeps his yearly eye apts and assess and takes care of his feet daily. I noticed he is not Taking an ACE inhibitor and his bp is elevated, especially the diastolic number. WE discussed prevention of end organ damage such as kidney disease. Keep scheduled apt with Jonnie in Jan, will send in glucagon pen.     2. Dysphoric mood  -     Ambulatory referral/consult to Psychiatry; Future; Expected date: 11/14/2024    3. Elevated BP without diagnosis of hypertension     We discussed goal parameters less than 130/80 or right at this number. I have asked him to start monitoring bp and let us know if it is consistently greater than 130/80 as we may need to start a low dose of medications. He does run at least 2-3 miles 5-6 times a week.     Future Appointments   Date Time Provider Department Center   1/14/2025 10:15 AM Juhi Robertson NP [facility] ENDO[facility]     Lab Frequency Next Occurrence   Ambulatory referral/consult to Endocrinology Once 04/18/2024   CBC Auto Differential Once 04/18/2024       Follow up in about 3 months (around 2/7/2025).        Ledy Thompson NP

## 2024-11-13 ENCOUNTER — PATIENT MESSAGE (OUTPATIENT)
Dept: RESEARCH | Facility: HOSPITAL | Age: 34
End: 2024-11-13
Payer: COMMERCIAL

## 2025-01-14 ENCOUNTER — TELEPHONE (OUTPATIENT)
Dept: ENDOCRINOLOGY | Facility: CLINIC | Age: 35
End: 2025-01-14

## 2025-01-14 ENCOUNTER — OFFICE VISIT (OUTPATIENT)
Dept: ENDOCRINOLOGY | Facility: CLINIC | Age: 35
End: 2025-01-14
Payer: COMMERCIAL

## 2025-01-14 ENCOUNTER — LAB VISIT (OUTPATIENT)
Dept: LAB | Facility: HOSPITAL | Age: 35
End: 2025-01-14
Attending: NURSE PRACTITIONER
Payer: COMMERCIAL

## 2025-01-14 VITALS
WEIGHT: 179.44 LBS | DIASTOLIC BLOOD PRESSURE: 80 MMHG | SYSTOLIC BLOOD PRESSURE: 161 MMHG | RESPIRATION RATE: 18 BRPM | HEIGHT: 67 IN | BODY MASS INDEX: 28.16 KG/M2 | TEMPERATURE: 98 F

## 2025-01-14 DIAGNOSIS — K31.84 DIABETIC GASTROPARESIS: Chronic | ICD-10-CM

## 2025-01-14 DIAGNOSIS — E11.43 DIABETIC GASTROPARESIS: Chronic | ICD-10-CM

## 2025-01-14 DIAGNOSIS — E16.A3: ICD-10-CM

## 2025-01-14 DIAGNOSIS — E10.65 TYPE 1 DIABETES MELLITUS WITH HYPERGLYCEMIA: Chronic | ICD-10-CM

## 2025-01-14 DIAGNOSIS — E10.65 TYPE 1 DIABETES MELLITUS WITH HYPERGLYCEMIA: Primary | Chronic | ICD-10-CM

## 2025-01-14 DIAGNOSIS — I10 HYPERTENSION, UNSPECIFIED TYPE: ICD-10-CM

## 2025-01-14 LAB
ANION GAP SERPL CALC-SCNC: 3 MEQ/L
BUN SERPL-MCNC: 11.7 MG/DL (ref 8.9–20.6)
CALCIUM SERPL-MCNC: 9.3 MG/DL (ref 8.4–10.2)
CHLORIDE SERPL-SCNC: 105 MMOL/L (ref 98–107)
CO2 SERPL-SCNC: 32 MMOL/L (ref 22–29)
CREAT SERPL-MCNC: 0.84 MG/DL (ref 0.72–1.25)
CREAT UR-MCNC: 85 MG/DL (ref 63–166)
CREAT/UREA NIT SERPL: 14
GFR SERPLBLD CREATININE-BSD FMLA CKD-EPI: >60 ML/MIN/1.73/M2
GLUCOSE SERPL-MCNC: 38 MG/DL (ref 74–100)
HBA1C MFR BLD: 7 %
MICROALBUMIN UR-MCNC: 36 UG/ML
MICROALBUMIN/CREAT RATIO PNL UR: 42.4 MG/GM CR (ref 0–30)
POTASSIUM SERPL-SCNC: 3.5 MMOL/L (ref 3.5–5.1)
SODIUM SERPL-SCNC: 140 MMOL/L (ref 136–145)

## 2025-01-14 PROCEDURE — 84681 ASSAY OF C-PEPTIDE: CPT

## 2025-01-14 PROCEDURE — 80048 BASIC METABOLIC PNL TOTAL CA: CPT

## 2025-01-14 PROCEDURE — 36415 COLL VENOUS BLD VENIPUNCTURE: CPT

## 2025-01-14 PROCEDURE — 86341 ISLET CELL ANTIBODY: CPT

## 2025-01-14 PROCEDURE — 3051F HG A1C>EQUAL 7.0%<8.0%: CPT | Mod: CPTII,,, | Performed by: NURSE PRACTITIONER

## 2025-01-14 PROCEDURE — 95251 CONT GLUC MNTR ANALYSIS I&R: CPT | Mod: ,,, | Performed by: NURSE PRACTITIONER

## 2025-01-14 PROCEDURE — 83036 HEMOGLOBIN GLYCOSYLATED A1C: CPT | Mod: PBBFAC | Performed by: NURSE PRACTITIONER

## 2025-01-14 PROCEDURE — 82570 ASSAY OF URINE CREATININE: CPT | Performed by: NURSE PRACTITIONER

## 2025-01-14 PROCEDURE — 99214 OFFICE O/P EST MOD 30 MIN: CPT | Mod: PBBFAC | Performed by: NURSE PRACTITIONER

## 2025-01-14 PROCEDURE — 1159F MED LIST DOCD IN RCRD: CPT | Mod: CPTII,,, | Performed by: NURSE PRACTITIONER

## 2025-01-14 PROCEDURE — 99205 OFFICE O/P NEW HI 60 MIN: CPT | Mod: S$PBB,,, | Performed by: NURSE PRACTITIONER

## 2025-01-14 PROCEDURE — 1160F RVW MEDS BY RX/DR IN RCRD: CPT | Mod: CPTII,,, | Performed by: NURSE PRACTITIONER

## 2025-01-14 PROCEDURE — 3008F BODY MASS INDEX DOCD: CPT | Mod: CPTII,,, | Performed by: NURSE PRACTITIONER

## 2025-01-14 RX ORDER — GLUCAGON INJECTION, SOLUTION 1 MG/.2ML
1 INJECTION, SOLUTION SUBCUTANEOUS ONCE
Qty: 2 EACH | Refills: 1 | Status: SHIPPED | OUTPATIENT
Start: 2025-01-14 | End: 2025-01-14

## 2025-01-14 RX ORDER — LISINOPRIL AND HYDROCHLOROTHIAZIDE 10; 12.5 MG/1; MG/1
1 TABLET ORAL DAILY
Qty: 90 TABLET | Refills: 3 | Status: SHIPPED | OUTPATIENT
Start: 2025-01-14 | End: 2026-01-14

## 2025-01-14 NOTE — PROGRESS NOTES
Received call from Shelly Grady in lab reporting a critical glucose of 38.  Call placed to patient he denies symptoms of hypoglycemia and states he has had his lunch and his current Dexcom reading is 175.

## 2025-01-14 NOTE — PROGRESS NOTES
Subjective     Patient ID: Tomer Boone is a 34 y.o. male.    Chief Complaint: Diabetes    Endocrine clinic note 01/14/2025:  34-year-old male scheduled today as new patient referral to endocrine clinic for history of type 1 diabetes with gastroparesis.  Type 1 diabetes current A1c 7.0 increased from previous 6.7.    Patient has a Dexcom interpretation 01/01/2025-01/14/2025.  Days with CGM data 93% 13/14 days.  Average glucose 135 forget GM I 6.5.  Time in range 80% very high 17% high, 57% in range, 9% low, 9% very low.  On patient's best day average glucose 104 with 88% in range.  Patient had variation in blood glucose on days.  Some days patient has hypoglycemia majority of the day blood sugars stay in 60-90 at times he will have prandial spikes sudden hypoglycemia down to 50.  On another occasion patient had significant hypoglycemia down to 50 from 3:00 a.m. until 7:00 a.m..  On Friday January 6th patient has hypoglycemia throughout the day with multiple alerts with hypoglycemia lowest being 42.  Other days patient has hypoglycemia during majority of the day above baseline.  Patient had times has hypoglycemia after prandial spikes.    Patient has gastroparesis and he states on days that his gastroparesis is bad that he gives insulin and will have a low before food prandial spikes.  At other times he will take insulin not he does not due to feeling nauseated with gastroparesis.  Majority of patients fluctuations in blood sugar is due to his gastroparesis.  He takes Reglan occasionally.  Discussed with the patient avoiding high fat foods, large meals and carbonated beverages.  On today's visit discussed with the patient insulin pumps.  Patient states in the past he is interested Omnipod endocrinologists within did not seem that interested.  Today on the visit gave patient information on a tandem pump, Omnipod 5, and ilet pump.  Foot exam performed today see documentation.     Over 50% of the visit spent  discussing with the patient insulin pumps.  Gastroparesis, and hypoglycemia.       Review of Systems   Constitutional:  Negative for activity change, appetite change, chills and fatigue.   HENT: Negative.  Negative for dental problem, hearing loss, rhinorrhea, sneezing and goiter.    Eyes: Negative.  Negative for photophobia and visual disturbance.   Respiratory: Negative.  Negative for cough, chest tightness and shortness of breath.    Cardiovascular:  Negative for chest pain, palpitations and leg swelling.   Gastrointestinal: Negative.  Negative for abdominal distention, abdominal pain, change in bowel habit, constipation, diarrhea, nausea and vomiting.   Endocrine: Negative.  Negative for cold intolerance, heat intolerance, polydipsia, polyphagia and polyuria.   Genitourinary: Negative.  Negative for difficulty urinating and erectile dysfunction.   Musculoskeletal:  Negative for back pain, joint swelling, leg pain, myalgias and joint deformity.   Integumentary:  Negative for color change, pallor and rash. Negative.   Allergic/Immunologic: Negative.  Negative for environmental allergies, food allergies and frequent infections.   Neurological: Negative.  Negative for tremors, syncope, headaches and coordination difficulties.   Hematological: Negative.  Does not bruise/bleed easily.   Psychiatric/Behavioral:  Negative for agitation and behavioral problems. The patient is not nervous/anxious and is not hyperactive.           Objective     Physical Exam  Constitutional:       General: He is not in acute distress.     Appearance: Normal appearance. He is normal weight. He is not ill-appearing.   HENT:      Head: Normocephalic.      Right Ear: External ear normal.      Left Ear: External ear normal.      Nose: No congestion or rhinorrhea.      Mouth/Throat:      Mouth: Mucous membranes are moist.      Pharynx: Oropharynx is clear.   Eyes:      Conjunctiva/sclera: Conjunctivae normal.      Pupils: Pupils are equal,  round, and reactive to light.   Neck:      Thyroid: No thyroid mass, thyromegaly or thyroid tenderness.   Cardiovascular:      Rate and Rhythm: Normal rate and regular rhythm.      Pulses: Normal pulses.      Heart sounds: Normal heart sounds. No murmur heard.  Pulmonary:      Effort: Pulmonary effort is normal. No respiratory distress.      Breath sounds: Normal breath sounds.   Abdominal:      General: Abdomen is flat. Bowel sounds are normal. There is no distension.      Palpations: Abdomen is soft.      Tenderness: There is no abdominal tenderness.   Genitourinary:     Testes: Normal.   Musculoskeletal:         General: Normal range of motion.      Cervical back: Normal range of motion and neck supple.      Right lower leg: No edema.      Left lower leg: No edema.      Right foot: Prominent metatarsal heads present.      Left foot: Prominent metatarsal heads present.   Feet:      Right foot:      Protective Sensation: 5 sites tested.  5 sites sensed.      Skin integrity: Dry skin present.      Toenail Condition: Right toenails are normal.      Left foot:      Protective Sensation: 5 sites tested.  5 sites sensed.      Skin integrity: Dry skin present.      Toenail Condition: Left toenails are normal.   Lymphadenopathy:      Cervical: No cervical adenopathy.   Skin:     General: Skin is warm and dry.      Coloration: Skin is not jaundiced or pale.   Neurological:      General: No focal deficit present.      Mental Status: He is alert and oriented to person, place, and time.      Motor: No weakness.      Coordination: Coordination normal.      Gait: Gait normal.   Psychiatric:         Mood and Affect: Mood normal.         Behavior: Behavior normal.         Thought Content: Thought content normal.         Judgment: Judgment normal.            Assessment and Plan     1. Type 1 diabetes mellitus with hyperglycemia  Current A1C 7.0 previous 6.7   A1C goal <7.0   Medications: Toujou u-300 16 units, Humalog 1:10 carb  ratio with correction   Yearly Diabetic Eye Exam: [facility] request records   Yearly Diabetic Foot Exam: 01/14/2025           Component Ref Range & Units 2 mo ago  (11/7/24) 9 mo ago  (4/11/24) 1 yr ago  (5/17/23) 2 yr ago  (11/21/22) 2 yr ago  (5/5/22)   Hemoglobin A1C, POC % 6.7 7.6 7.6 Abnormal  R 7.3 Abnormal  R 7.3      -     Ambulatory referral/consult to Endocrinology  -     POCT HEMOGLOBIN A1C  -     glucagon (GVOKE HYPOPEN 2-PACK) 1 mg/0.2 mL AtIn; Inject 1 mg into the skin once. for 1 dose  Dispense: 2 each; Refill: 1  -     Cancel: Hemoglobin A1C, POCT  -     Microalbumin/Creatinine Ratio, Urine  -     C-Peptide; Future; Expected date: 01/14/2025  -     Basic Metabolic Panel; Future; Expected date: 01/14/2025  -     GAD65 Ab, Serum; Future; Expected date: 01/14/2025    2. Diabetic gastroparesis  Reglan as needed   Small meals frequent   Easy to digest foods  Avoid fried foods, Carbonated beverages     3. Hypoglycemia level 3  -     glucagon (GVOKE HYPOPEN 2-PACK) 1 mg/0.2 mL AtIn; Inject 1 mg into the skin once. for 1 dose  Dispense: 2 each; Refill: 1            Follow up in about 3 months (around 4/14/2025) for type 1 diabetes, w/ Dexcom.      I spent a total of 60 minutes on the day of the visit.  This includes face to face time and non-face to face time preparing to see the patient (eg, review of tests), obtaining and/or reviewing separately obtained history, documenting clinical information in the electronic or other health record, independently interpreting results and communicating results to the patient/family/caregiver, or care coordinator.

## 2025-01-15 ENCOUNTER — PATIENT MESSAGE (OUTPATIENT)
Dept: ENDOCRINOLOGY | Facility: CLINIC | Age: 35
End: 2025-01-15
Payer: COMMERCIAL

## 2025-01-16 LAB
C PEPTIDE P FAST SERPL-MCNC: <0.1 NG/ML (ref 1.1–4.4)
GAD65 AB SER-SCNC: 0 NMOL/L

## 2025-01-22 ENCOUNTER — PATIENT MESSAGE (OUTPATIENT)
Dept: ENDOCRINOLOGY | Facility: CLINIC | Age: 35
End: 2025-01-22
Payer: COMMERCIAL

## 2025-01-22 ENCOUNTER — TELEPHONE (OUTPATIENT)
Dept: ENDOCRINOLOGY | Facility: CLINIC | Age: 35
End: 2025-01-22
Payer: COMMERCIAL

## 2025-01-22 DIAGNOSIS — E10.9 TYPE 1 DIABETES MELLITUS WITHOUT COMPLICATION: Chronic | ICD-10-CM

## 2025-01-27 ENCOUNTER — TELEPHONE (OUTPATIENT)
Dept: ENDOCRINOLOGY | Facility: CLINIC | Age: 35
End: 2025-01-27
Payer: COMMERCIAL

## 2025-01-27 RX ORDER — INSULIN LISPRO 100 [IU]/ML
INJECTION, SOLUTION INTRAVENOUS; SUBCUTANEOUS
Qty: 15 EACH | Refills: 1 | Status: SHIPPED | OUTPATIENT
Start: 2025-01-27

## 2025-01-30 ENCOUNTER — TELEPHONE (OUTPATIENT)
Dept: ENDOCRINOLOGY | Facility: CLINIC | Age: 35
End: 2025-01-30
Payer: COMMERCIAL

## 2025-02-21 ENCOUNTER — TELEPHONE (OUTPATIENT)
Dept: ENDOCRINOLOGY | Facility: CLINIC | Age: 35
End: 2025-02-21
Payer: COMMERCIAL

## 2025-02-21 NOTE — TELEPHONE ENCOUNTER
----- Message from Alena sent at 2/20/2025 12:53 PM CST -----  Patient of [place] with [employer] called for status of request sent for Test of insuline pumpRequest was faxed over on 2/18/25858 [phone]

## 2025-03-06 ENCOUNTER — PATIENT MESSAGE (OUTPATIENT)
Dept: ADMINISTRATIVE | Facility: HOSPITAL | Age: 35
End: 2025-03-06
Payer: COMMERCIAL

## 2025-03-13 ENCOUNTER — TELEPHONE (OUTPATIENT)
Dept: ENDOCRINOLOGY | Facility: CLINIC | Age: 35
End: 2025-03-13
Payer: COMMERCIAL

## 2025-03-13 NOTE — LETTER
March 13, 2025    Tomer Boone  [address] [address]  [place] [address]             [facility]  [address] [address]  Phone: [phone] Our office and Diabetes Education have been trying to contact you regarding your Tandem Pump. In regards we are unable to process required paperwork. Please contact our office at [phone], at your earliest convenience to follow up on ordering Tandem Supplies thank you kindly      [facility]

## 2025-03-13 NOTE — TELEPHONE ENCOUNTER
Called to reach out to patient concerning insurance coverage for tandem Pump, unable to reach and leave a voicemail due to mailbox messaging full. Patient status shows no insurance coverage.

## 2025-04-17 ENCOUNTER — E-VISIT (OUTPATIENT)
Dept: URGENT CARE | Facility: CLINIC | Age: 35
End: 2025-04-17
Payer: COMMERCIAL

## 2025-04-17 DIAGNOSIS — B00.1 FEVER BLISTER: Primary | ICD-10-CM

## 2025-04-17 RX ORDER — VALACYCLOVIR HYDROCHLORIDE 1 G/1
TABLET, FILM COATED ORAL
Qty: 20 TABLET | Refills: 0 | Status: SHIPPED | OUTPATIENT
Start: 2025-04-17

## 2025-04-17 NOTE — PROGRESS NOTES
Patient ID: Tomer Boone is a 34 y.o. male.    Chief Complaint: General Illness (Entered automatically based on patient selection in [facility] ([facility]).)          274}  The patient initiated a request through [facility] ([facility]) on 4/17/2025 for evaluation and management with a chief complaint of General Illness (Entered automatically based on patient selection in [facility] ([facility]).)     I evaluated the questionnaire submission on 04/17/2025 .    Total Time (in minutes): 5     [facility] Peq Evisit [facility]-Medication    4/17/2025 10:02 AM CDT - Filed by Patient   What do you need help with? Medication Request   Do you agree to participate in an E-Visit? Yes   If you have any of the following symptoms, please present to your local emergency room or call 911:  I acknowledge   Medication requests for narcotics will not be addressed via an E-Visit.  Please schedule an appointment. I acknowledge   Do you want to address a new or existing medication? I would like to start a new medication that I do not already take   What is the main issue you would like addressed today? I have a fever blister and an infant. Im with my infant the majority of the day alone. I need medication ti heal the cold sore quickly.   What is the name of the medication that you would like to start? acyclovir, valacyclovir, famciclovir   Have you taken a similar medication in the past? No   Why are you requesting this particular medication? I have a cold sore    What medical condition is the  medication intended to treat? Cold sore   Provide any additional information you feel is important.    Please attach any relevant images or files    Are you able to take your vital signs? Yes   Systolic Blood Pressure: 114   Diastolic Blood Pressure: 77   Weight:    Height: 67   Pulse: 69   Temperature:    Respiration rate:    Pulse Oxygen:           Active Problem List with Overview Notes    Diagnosis Date Noted    Diabetic gastroparesis 04/11/2024    Type 1 diabetes mellitus 06/08/2017       Recent Labs Obtained:  Lab Results   Component Value Date    WBC 10.0 04/18/2024    HGB 11.8 (L) 04/18/2024    HCT 36.9 (L) 04/18/2024    MCV 93.7 04/18/2024     11/21/2022     01/14/2025    K 3.5 01/14/2025     (H) 04/11/2024    CREATININE 0.84 01/14/2025    EGFRNORACEVR >60 01/14/2025    HGBA1C 9.4 (H) 01/24/2022    TSH 0.557 04/11/2024      Review of patient's allergies indicates:   Allergen Reactions    Iodine Other (See Comments)       Encounter Diagnosis   Name Primary?    Fever blister Yes        No orders of the defined types were placed in this encounter.     Medications Ordered This Encounter   Medications    valACYclovir (VALTREX) 1000 MG tablet     Sig: Take 2 pills po q12h x 1 day prn first signs of fever blister.     Dispense:  20 tablet     Refill:  0        E-Visit Time Tracking:    Day 1 Time (in minutes): 5    Total Time (in minutes): 5      274}

## 2025-04-22 DIAGNOSIS — E10.65 TYPE 1 DIABETES MELLITUS WITH HYPERGLYCEMIA: Chronic | ICD-10-CM

## 2025-04-24 RX ORDER — BLOOD-GLUCOSE SENSOR
EACH MISCELLANEOUS
Qty: 3 EACH | Refills: 11 | Status: SHIPPED | OUTPATIENT
Start: 2025-04-24

## 2025-04-29 ENCOUNTER — PATIENT MESSAGE (OUTPATIENT)
Dept: FAMILY MEDICINE | Facility: CLINIC | Age: 35
End: 2025-04-29
Payer: COMMERCIAL

## 2025-04-29 DIAGNOSIS — E10.65 TYPE 1 DIABETES MELLITUS WITH HYPERGLYCEMIA: Chronic | ICD-10-CM

## 2025-04-30 RX ORDER — BLOOD-GLUCOSE SENSOR
EACH MISCELLANEOUS
Qty: 3 EACH | Refills: 11 | Status: SHIPPED | OUTPATIENT
Start: 2025-04-30

## 2025-05-06 ENCOUNTER — OFFICE VISIT (OUTPATIENT)
Dept: FAMILY MEDICINE | Facility: CLINIC | Age: 35
End: 2025-05-06
Payer: MEDICAID

## 2025-05-06 VITALS
BODY MASS INDEX: 28.56 KG/M2 | RESPIRATION RATE: 16 BRPM | HEIGHT: 67 IN | DIASTOLIC BLOOD PRESSURE: 84 MMHG | TEMPERATURE: 99 F | WEIGHT: 182 LBS | HEART RATE: 68 BPM | OXYGEN SATURATION: 96 % | SYSTOLIC BLOOD PRESSURE: 136 MMHG

## 2025-05-06 DIAGNOSIS — I10 ESSENTIAL HYPERTENSION: Chronic | ICD-10-CM

## 2025-05-06 DIAGNOSIS — Z20.2 POSSIBLE EXPOSURE TO STI: ICD-10-CM

## 2025-05-06 DIAGNOSIS — Z72.51 HIGH RISK SEXUAL BEHAVIOR, UNSPECIFIED TYPE: ICD-10-CM

## 2025-05-06 DIAGNOSIS — E10.65 TYPE 1 DIABETES MELLITUS WITH HYPERGLYCEMIA: Primary | Chronic | ICD-10-CM

## 2025-05-06 LAB
AMORPH URATE CRY URNS QL MICRO: NEGATIVE
BACTERIA #/AREA URNS HPF: ABNORMAL /[HPF]
BILIRUB UR QL STRIP: NEGATIVE
CHOLEST SERPL-MCNC: 37 MG/DL (ref 0–150)
CHOLEST SERPL-MSCNC: 180 MG/DL (ref 100–200)
CLARITY UR: CLEAR
COLOR UR: YELLOW
EPITHELIAL CELLS: NEGATIVE
ESTIMATED AVERAGE GLUCOSE: 181 MG/DL (ref 70–126)
GLUCOSE (UA): NEGATIVE MG/DL
HBA1C MFR BLD: 7.9 % (ref 4–5.6)
HDLC SERPL-MCNC: 95 MG/DL
HIV 1+2 AB+HIV1 P24 AG SERPL QL IA: NONREACTIVE
KETONES UR QL STRIP: NEGATIVE MG/DL
LDL/HDL RATIO: NORMAL
LDLC SERPL CALC-MCNC: NORMAL MG/DL
LEUKOCYTE ESTERASE UR QL STRIP: ABNORMAL
MUCOUS THREADS URNS QL MICRO: ABNORMAL
NITRITE UR QL STRIP: NEGATIVE
OCCULT BLOOD: ABNORMAL
PH, URINE: 6.5 (ref 5–7.5)
PROT UR QL STRIP: 75 MG/DL
RBC/HPF: ABNORMAL
SP GR UR STRIP: 1.01 (ref 1–1.03)
UROBILINOGEN, URINE: 1 E.U./DL (ref 0–1)
WBC/HPF: ABNORMAL

## 2025-05-06 NOTE — PROGRESS NOTES
Subjective:       Patient ID: Tomer Boone is a 34 y.o. male.    Chief Complaint: Follow-up (Pt is here for a follow up and A1c check. )    He recently changed employment. He will be starting as  at [employer] soon and he will obtain new insurance from his employer. Currently his medicaid policy is NOT covering is his Dexcom sensors. We discussed the Seymour Freestyle 3 Plus... but he is unsure if his new insulin pump will communicate with the Freestyle brand.   His last A1c 9.4% in Jan 2025; insulin pump setting adjusted by his endocrinologist. We will obtain new level today in preparation for his endo appt tomorrow,    He is in new heterosexual relationship and he is requesting STI testing.       Review of Systems   Constitutional:  Negative for chills, fatigue and fever.   Respiratory:  Negative for cough and wheezing.    Cardiovascular:  Negative for chest pain and palpitations.   Skin:  Negative for color change and rash.   Neurological:  Negative for dizziness, light-headedness and headaches.           Past Medical History:  Past Medical History:   Diagnosis Date    Diabetes mellitus type I       History reviewed. No pertinent surgical history.   Review of patient's allergies indicates:   Allergen Reactions    Iodine Other (See Comments)      Current Medications[1]  Social History[2]   No family history on file.     Objective:      Physical Exam  Vitals and nursing note reviewed.   Constitutional:       Appearance: Normal appearance.   HENT:      Head: Normocephalic.      Nose: Nose normal.      Mouth/Throat:      Mouth: Mucous membranes are moist.   Eyes:      Conjunctiva/sclera: Conjunctivae normal.   Cardiovascular:      Rate and Rhythm: Normal rate and regular rhythm.   Pulmonary:      Effort: Pulmonary effort is normal.      Breath sounds: Normal breath sounds.   Musculoskeletal:      Cervical back: Normal range of motion.   Skin:     General: Skin is warm and dry.   Neurological:       General: No focal deficit present.      Mental Status: He is alert.   Psychiatric:         Mood and Affect: Mood normal.         Behavior: Behavior normal.         Assessment:     1. Type 1 diabetes mellitus with hyperglycemia Stable   2. Essential hypertension Stable   3. High risk sexual behavior, unspecified type    4. Possible exposure to STI      Plan:       PROBLEM LIST     Type 1 diabetes mellitus with hyperglycemia  Comments:  see notes below  Orders:  -     Hemoglobin A1C  -     Lipid Panel    Essential hypertension  Comments:  BP near goal; continue lisinopril-hctz  Orders:  -     Urinalysis, Reflex to Urine Culture Urine, Clean Catch    High risk sexual behavior, unspecified type  -     HIV 1/2 Ag/Ab (4th Gen)  -     C. trachomatis/N. gonorrhoeae by AMP DNA Other; Urine  -     Trichomonas vaginalis, RNA, Qual    Possible exposure to STI  -     HIV 1/2 Ag/Ab (4th Gen)  -     C. trachomatis/N. gonorrhoeae by AMP DNA Other; Urine  -     Trichomonas vaginalis, RNA, Qual        A1c obtained today in preparation for his appt with endo tomorrow.   Provided him with Freestyle Seymour 3 Plus sensor samples.   STI panel obtained.        [1]   Current Outpatient Medications   Medication Sig Dispense Refill    diclofenac sodium (VOLTAREN) 1 % Gel Apply 2 g topically 4 (four) times daily. 100 g 3    glucagon HCL (GLUCAGON, HCL, EMERGENCY KIT) 1 mg SolR Inject 1 mg as directed as needed. 1 each 1    HUMALOG KWIKPEN INSULIN 100 unit/mL pen INJECT 6 UNITS INTO THE SKIN 3 TIMES DAILY WITH MEALS with correction 1:50 >150 max dose 40 units a day 15 each 1    insulin glargine U-300 conc (TOUJEO MAX U-300 SOLOSTAR) 300 unit/mL (3 mL) insulin pen Inject 30 Units into the skin every evening. INJECT 30 UNITS INTO THE SKIN EVERY EVENING. 9 mL 3    lisinopriL-hydrochlorothiazide (PRINZIDE,ZESTORETIC) 10-12.5 mg per tablet Take 1 tablet by mouth once daily. 90 tablet 3    metoclopramide HCl (REGLAN) 10 MG tablet TAKE 1 TABLET BY  "MOUTH FOUR TIMES A DAY BEFORE MEALS AND AT BEDTIME 120 tablet 3    pen needle, diabetic (BD ULTRA-FINE SHORT PEN NEEDLE) 31 gauge x 5/16" Ndle Use 1 needle three times daily as directed 200 each 5    pen needle, diabetic 31 gauge x 1/4" Ndle Use 1 needle three times daily as directed 100 each 11    valACYclovir (VALTREX) 1000 MG tablet Take 2 pills po q12h x 1 day prn first signs of fever blister. 20 tablet 0     No current facility-administered medications for this visit.   [2]   Social History  Socioeconomic History    Marital status:    Tobacco Use    Smoking status: Former     Types: Cigarettes     Passive exposure: Current    Smokeless tobacco: Never    Tobacco comments:     Works at [employer]   Substance and Sexual Activity    Alcohol use: Yes     Comment: socially    Drug use: Yes     Types: Marijuana     Comment: "medical"    Sexual activity: Yes     Social Drivers of Health     Financial Resource Strain: Low Risk  (11/7/2024)    Overall Financial Resource Strain (CARDIA)     Difficulty of Paying Living Expenses: Not hard at all   Food Insecurity: No Food Insecurity (11/7/2024)    Hunger Vital Sign     Worried About Running Out of Food in the Last Year: Never true     Ran Out of Food in the Last Year: Never true   Physical Activity: Sufficiently Active (11/7/2024)    Exercise Vital Sign     Days of Exercise per Week: 6 days     Minutes of Exercise per Session: 60 min   Stress: Stress Concern Present (11/7/2024)    [place] [facility] [employer] - Occupational Stress Questionnaire     Feeling of Stress : Rather much   Housing Stability: Unknown (11/7/2024)    Housing Stability Vital Sign     Unable to Pay for Housing in the Last Year: No     "

## 2025-05-07 ENCOUNTER — OFFICE VISIT (OUTPATIENT)
Dept: ENDOCRINOLOGY | Facility: CLINIC | Age: 35
End: 2025-05-07
Payer: MEDICAID

## 2025-05-07 ENCOUNTER — PATIENT MESSAGE (OUTPATIENT)
Dept: FAMILY MEDICINE | Facility: CLINIC | Age: 35
End: 2025-05-07
Payer: MEDICAID

## 2025-05-07 ENCOUNTER — RESULTS FOLLOW-UP (OUTPATIENT)
Dept: FAMILY MEDICINE | Facility: CLINIC | Age: 35
End: 2025-05-07

## 2025-05-07 VITALS
SYSTOLIC BLOOD PRESSURE: 104 MMHG | HEIGHT: 67 IN | BODY MASS INDEX: 29.1 KG/M2 | RESPIRATION RATE: 18 BRPM | WEIGHT: 185.44 LBS | TEMPERATURE: 98 F | DIASTOLIC BLOOD PRESSURE: 68 MMHG | HEART RATE: 76 BPM

## 2025-05-07 DIAGNOSIS — E10.65 TYPE 1 DIABETES MELLITUS WITH HYPERGLYCEMIA: Primary | ICD-10-CM

## 2025-05-07 LAB
CHLAMYDIA: NEGATIVE
GONORRHEA: NEGATIVE
SOURCE: NORMAL

## 2025-05-07 PROCEDURE — 4010F ACE/ARB THERAPY RXD/TAKEN: CPT | Mod: CPTII,,, | Performed by: NURSE PRACTITIONER

## 2025-05-07 PROCEDURE — 1160F RVW MEDS BY RX/DR IN RCRD: CPT | Mod: CPTII,,, | Performed by: NURSE PRACTITIONER

## 2025-05-07 PROCEDURE — 1159F MED LIST DOCD IN RCRD: CPT | Mod: CPTII,,, | Performed by: NURSE PRACTITIONER

## 2025-05-07 PROCEDURE — 3060F POS MICROALBUMINURIA REV: CPT | Mod: CPTII,,, | Performed by: NURSE PRACTITIONER

## 2025-05-07 PROCEDURE — 3078F DIAST BP <80 MM HG: CPT | Mod: CPTII,,, | Performed by: NURSE PRACTITIONER

## 2025-05-07 PROCEDURE — 3008F BODY MASS INDEX DOCD: CPT | Mod: CPTII,,, | Performed by: NURSE PRACTITIONER

## 2025-05-07 PROCEDURE — 3051F HG A1C>EQUAL 7.0%<8.0%: CPT | Mod: CPTII,,, | Performed by: NURSE PRACTITIONER

## 2025-05-07 PROCEDURE — 3066F NEPHROPATHY DOC TX: CPT | Mod: CPTII,,, | Performed by: NURSE PRACTITIONER

## 2025-05-07 PROCEDURE — 99215 OFFICE O/P EST HI 40 MIN: CPT | Mod: S$PBB,,, | Performed by: NURSE PRACTITIONER

## 2025-05-07 PROCEDURE — 99215 OFFICE O/P EST HI 40 MIN: CPT | Mod: PBBFAC | Performed by: NURSE PRACTITIONER

## 2025-05-07 PROCEDURE — 3074F SYST BP LT 130 MM HG: CPT | Mod: CPTII,,, | Performed by: NURSE PRACTITIONER

## 2025-05-07 RX ORDER — BLOOD-GLUCOSE,RECEIVER,CONT
EACH MISCELLANEOUS
Qty: 1 EACH | Refills: 0 | Status: SHIPPED | OUTPATIENT
Start: 2025-05-07

## 2025-05-07 RX ORDER — INSULIN LISPRO 100 [IU]/ML
INJECTION, SOLUTION INTRAVENOUS; SUBCUTANEOUS
Qty: 2000 ML | Refills: 11 | Status: SHIPPED | OUTPATIENT
Start: 2025-05-07 | End: 2025-05-08

## 2025-05-07 RX ORDER — BLOOD-GLUCOSE SENSOR
EACH MISCELLANEOUS
Qty: 3 EACH | Refills: 11 | Status: SHIPPED | OUTPATIENT
Start: 2025-05-07 | End: 2025-05-07 | Stop reason: SDUPTHER

## 2025-05-07 RX ORDER — BLOOD-GLUCOSE SENSOR
EACH MISCELLANEOUS
Qty: 3 EACH | Refills: 11 | Status: SHIPPED | OUTPATIENT
Start: 2025-05-07

## 2025-05-07 NOTE — ASSESSMENT & PLAN NOTE
Omnipod 5 paperwork initiated  Rx insulin 2000 units/20 mL for Omnipod injection sent to preferred pharmacy.  Hemoglobin A1c blood goal is less than 7%, current hemoglobin A1c at 7.9%  Educated patient fasting blood glucose goal should be between   Blood glucose 2 hours after eating should be less than 180  Patient to increase Lantus by 3 units starting from tomorrow every 3 days until his fasting blood glucose has been between .  Patient to continue prandial insulin  1 unit per 10 g of carbohydrate with corrections of 50 points above 150 by adding 1 unit.  A copy of correction sheet provided to patient.  Educated patient carbohydrate maximum amount should be no more than 50 g per meal and snacks should be less than 15 g per snack but no more than 2 snacks a day  We discuss Omnipod 5.  Patient agreed.  Continue with insulin dosing until you start Omnipod 5  Patient to meet with diabetic educator when he receives his equipment and insulin to start Omnipod.  Caution patient not to start Omnipod until he meets with diabetic educator for initiation of Omnipod.  Patient agreed and verbalized understanding.  Start exercising at least 30 minutes a day up to 5 days a week as simple as brisk walking.    Patient agreed to start Dexcom G7   Stay hydrated with water.  Avoid sugary drinks.  Get plenty of sleep at least 7-8 hours a night.    Follow low-fat, low-cholesterol diet   Keep fiber intake between 25-30 g per day  Patient to read discharge education materials.  Patient  referred to diabetes education.  Questions solicited and answered, patient verbalized understanding and agreed to plan of care

## 2025-05-07 NOTE — PROGRESS NOTES
Patient Name: Tomer Boone   : 1990  MRN: [id number]     SUBJECTIVE DATA:    CHIEF COMPLAINT:   Tomer Boone is a 34 y.o. male who presents to clinic today with Follow-up (Type 1 diabetes )        HPI:  34 year Old male presents to the endocrine clinic to follow-up on type 1 diabetes.  History of gastroparesis.     Patient has been type 1 diabetic since 12 -13 years of age    2025 hemoglobin A1c 7.9%  2025 jerry 65 0.00, C-peptide less than 0.1, urine microalbumin 36, urine creatinine 85, microalbumin to creatinine ratio 42.4    Patient currently on Lantus 16 units at bedtime, and prandial insulin patient uses 1 unit per 10 g of carbohydrate with corrections of 50 points above 150 by adding 1 unit.  A copy of correction sheet provided to patient.    Patient got samples of freestyle Seymour 3.  Patient just started on the CGM yesterday.  Not enough data to discuss.    Patient would like to start on a insulin pump.  We discuss Omnipod 5.  Patient agreed.  Dexcom G7 sensors with  sent to his preferred pharmacy.  Patient to meet with diabetic educator when he receives his equipment and insulin to start Omnipod.  Caution patient not to start Omnipod until he meets with diabetic educator for initiation of Omnipod.  Patient agreed and verbalized understanding.    Care plan:  Omnipod 5 paperwork initiated  Rx insulin 2000 units/20 mL for Omnipod injection sent to preferred pharmacy.  Hemoglobin A1c blood goal is less than 7%, current hemoglobin A1c at 7.9%  Educated patient fasting blood glucose goal should be between   Blood glucose 2 hours after eating should be less than 180  Patient to increase Lantus by 3 units starting from tomorrow every 3 days until his fasting blood glucose has been between .  Patient to continue prandial insulin  1 unit per 10 g of carbohydrate with corrections of 50 points above 150 by adding 1 unit.  A copy of correction sheet provided to patient.  Educated  "patient carbohydrate maximum amount should be no more than 50 g per meal and snacks should be less than 15 g per snack but no more than 2 snacks a day  We discuss Omnipod 5.  Patient agreed.  Continue with insulin dosing until you start Omnipod 5  Patient to meet with diabetic educator when he receives his equipment and insulin to start Omnipod.  Caution patient not to start Omnipod until he meets with diabetic educator for initiation of Omnipod.  Patient agreed and verbalized understanding.  Start exercising at least 30 minutes a day up to 5 days a week as simple as brisk walking.    Patient agreed to start Dexcom G7   Stay hydrated with water.  Avoid sugary drinks.  Get plenty of sleep at least 7-8 hours a night.    Follow low-fat, low-cholesterol diet   Keep fiber intake between 25-30 g per day  Patient to read discharge education materials.  Patient  referred to diabetes education.  Questions solicited and answered, patient verbalized understanding and agreed to plan of care        Patient denies chest pain, shortness of breath, dyspnea on exertion, palpitations, peripheral edema, abdominal pain, nausea, vomiting, diarrhea, constipation, fatigue, fever, chills, dysuria,  hematuria, melena, or hematochezia.        ALLERGIES:   Review of patient's allergies indicates:   Allergen Reactions    Iodine Other (See Comments)         ROS:  Review of Systems   All other systems reviewed and are negative.        OBJECTIVE DATA:  Vital signs  Vitals:    05/07/25 0923 05/07/25 1024   BP: (!) 141/103 104/68   BP Location: Left arm    Patient Position: Sitting    Pulse: 76    Resp: 18    Temp: 97.8 °F (36.6 °C)    TempSrc: Oral    Weight: 84.1 kg (185 lb 6.5 oz)    Height: 5' 7" (1.702 m)       Body mass index is 29.04 kg/m².    PHYSICAL EXAM:   Physical Exam  Vitals and nursing note reviewed.   Constitutional:       General: He is awake. He is not in acute distress.     Appearance: Normal appearance. He is well-developed, " well-groomed and overweight. He is not ill-appearing, toxic-appearing or diaphoretic.   HENT:      Head: Normocephalic and atraumatic.      Right Ear: External ear normal.      Left Ear: External ear normal.      Nose: Nose normal.      Mouth/Throat:      Lips: Pink.      Mouth: Mucous membranes are moist.      Pharynx: Oropharynx is clear.   Eyes:      General: Gaze aligned appropriately.      Extraocular Movements: Extraocular movements intact.      Pupils: Pupils are equal, round, and reactive to light.   Neck:      Thyroid: No thyroid mass, thyromegaly or thyroid tenderness.      Trachea: Trachea and phonation normal.   Cardiovascular:      Rate and Rhythm: Normal rate and regular rhythm.      Pulses: Normal pulses.           Radial pulses are 2+ on the right side and 2+ on the left side.        Dorsalis pedis pulses are 2+ on the right side and 2+ on the left side.        Posterior tibial pulses are 2+ on the right side and 2+ on the left side.      Heart sounds: Normal heart sounds. No murmur heard.  Pulmonary:      Effort: Pulmonary effort is normal.      Breath sounds: Normal breath sounds. No wheezing.   Abdominal:      General: Abdomen is flat.      Palpations: Abdomen is soft.   Musculoskeletal:         General: Normal range of motion.      Cervical back: Full passive range of motion without pain and normal range of motion.      Right foot: Normal range of motion. No deformity, bunion, Charcot foot, foot drop or prominent metatarsal heads.      Left foot: Normal range of motion. No deformity, bunion, Charcot foot, foot drop or prominent metatarsal heads.   Feet:      Right foot:      Protective Sensation: 10 sites tested.  10 sites sensed.      Skin integrity: Dry skin present.      Toenail Condition: Right toenails are normal.      Left foot:      Protective Sensation: 10 sites tested.  10 sites sensed.      Skin integrity: Dry skin present.      Toenail Condition: Left toenails are normal.       Comments: Inspect feet daily.  Continue to wear appropriate footwear with closed toes.  Keep good foot hygiene.  Keep feet moisturized and do not apply moisture between toe webs  Keep nails trimmed    Lymphadenopathy:      Cervical: No cervical adenopathy.   Skin:     General: Skin is warm.      Capillary Refill: Capillary refill takes less than 2 seconds.   Neurological:      General: No focal deficit present.      Mental Status: He is alert and oriented to person, place, and time. Mental status is at baseline.      GCS: GCS eye subscore is 4. GCS verbal subscore is 5. GCS motor subscore is 6.      Cranial Nerves: No cranial nerve deficit.      Sensory: No sensory deficit.      Motor: No weakness.      Coordination: Coordination normal.      Gait: Gait normal.   Psychiatric:         Mood and Affect: Mood normal.         Behavior: Behavior normal. Behavior is cooperative.         Thought Content: Thought content normal.         Judgment: Judgment normal.          ASSESSMENT/PLAN:  1. Type 1 diabetes mellitus with hyperglycemia  Assessment & Plan:  Omnipod 5 paperwork initiated  Rx insulin 2000 units/20 mL for Omnipod injection sent to preferred pharmacy.  Hemoglobin A1c blood goal is less than 7%, current hemoglobin A1c at 7.9%  Educated patient fasting blood glucose goal should be between   Blood glucose 2 hours after eating should be less than 180  Patient to increase Lantus by 3 units starting from tomorrow every 3 days until his fasting blood glucose has been between .  Patient to continue prandial insulin  1 unit per 10 g of carbohydrate with corrections of 50 points above 150 by adding 1 unit.  A copy of correction sheet provided to patient.  Educated patient carbohydrate maximum amount should be no more than 50 g per meal and snacks should be less than 15 g per snack but no more than 2 snacks a day  We discuss Omnipod 5.  Patient agreed.  Continue with insulin dosing until you start Omnipod  5  Patient to meet with diabetic educator when he receives his equipment and insulin to start Omnipod.  Caution patient not to start Omnipod until he meets with diabetic educator for initiation of Omnipod.  Patient agreed and verbalized understanding.  Start exercising at least 30 minutes a day up to 5 days a week as simple as brisk walking.    Patient agreed to start Dexcom G7   Stay hydrated with water.  Avoid sugary drinks.  Get plenty of sleep at least 7-8 hours a night.    Follow low-fat, low-cholesterol diet   Keep fiber intake between 25-30 g per day  Patient to read discharge education materials.  Patient  referred to diabetes education.  Questions solicited and answered, patient verbalized understanding and agreed to plan of care      Orders:  -     Foot Exam Performed  -     Discontinue: blood-glucose sensor (DEXCOM G7 SENSOR) Elisabet; Change every 10 days  Dispense: 3 each; Refill: 11  -     blood-glucose sensor (DEXCOM G7 SENSOR) Elisabet; Change every 10 days  Dispense: 3 each; Refill: 11  -     blood-glucose,,cont (DEXCOM G7 ) Misc; Dexcom G7  uses directed  Dispense: 1 each; Refill: 0  -     insulin lispro 100 unit/mL injection; Inject to Omnipod 5 per protocol  Dispense: 2000 mL; Refill: 11  -     Ambulatory referral/consult to Diabetes Education           RESULTS:  Recent Results (from the past 6 weeks)   Hemoglobin A1C    Collection Time: 05/06/25  9:25 AM   Result Value Ref Range    Hemoglobin A1C 7.9 (H) 4.0 - 5.6 %    EST AVERAGE GLUCOSE 181 (H) 70 - 126 MG/DL   Lipid Panel    Collection Time: 05/06/25  9:25 AM   Result Value Ref Range    Cholesterol 180 100 - 200 mg/dL    Triglycerides 37 0 - 150 mg/dL    HDL 95 >60 mg/dL    LDL Cholesterol Test Not Performed     LDL/HDL Ratio Test Not Performed    HIV 1/2 Ag/Ab (4th Gen)    Collection Time: 05/06/25  9:25 AM   Result Value Ref Range    HIV 1/2 Ag/Ab NONREACTIVE NONREACTIVE   C. trachomatis/N. gonorrhoeae by AMP DNA Other;  Urine    Collection Time: 05/06/25  9:26 AM   Result Value Ref Range    Source URINE     Chlamydia NEGATIVE NEGATIVE    Gonorrhea NEGATIVE NEGATIVE   Urinalysis, Reflex to Urine Culture Urine, Clean Catch    Collection Time: 05/06/25  9:26 AM    Specimen: Urine, Clean Catch   Result Value Ref Range    Color, UA YELLOW     Clarity, UA CLEAR     Specific Gravity,UA 1.015 1.005 - 1.030    pH, Urine 6.5 5 - 7.5    Leukocytes, UA SMALL (A) NEGATIVE    Nitrite, Urine NEGATIVE NEGATIVE    Protein, UA 75 (H) NEGATIVE mg/dL    Glucose, UA NEGATIVE NEGATIVE mg/dL    Ketones, UA NEGATIVE NEGATIVE mg/dL    Urobilinogen, urine 1.0 0 - 1.0 E.U./dL    Bilirubin (UA) NEGATIVE NEGATIVE    Occult Blood TRACE (A) NEGATIVE    WBC/HPF RARE <5    RBC/HPF RARE <5    Amorphous, UA NEGATIVE     Bacteria, UA TRACE NEG-TRACE    Epithelial Cells NEGATIVE NEGATIVE-FEW    Mucus, UA 3+ (A) NEGATIVE         Follow Up:  Follow up in about 15 weeks (around 8/20/2025).     55 minutes of total time spent on the encounter, which includes face to face time and non-face to face time preparing to see the patient (eg, review of tests), Obtaining and/or reviewing separately obtained history, Documenting clinical information in the electronic or other health record, Independently interpreting results (not separately reported) and communicating results to the patient/family/caregiver, or Care coordination (not separately reported).      Previous medical history/lab work/radiology reviewed and considered during medical management decisions.   Medication list reviewed and medication reconciliation performed.  Patient was provided  and care about his/her current diagnosis (es) and medications including risk/benefit and side effects/adverse events, over the counter medication uses/doses, home self-care and contact precautions,  and red flags and indications for when to seek immediate medical attention.   Patient was advised to continue compliance with  current medication list and medical recommendations.  Patient dvised continued compliance with recommended eating habits/ diets for medical conditions and exercise 150 minutes/ week (if possible) for medical condition (s).  Educational handouts and instructions on selected disease management in AVS (After Visit Summary).    All of the patient's questions were answered to patient's satisfaction.   The patient was receptive, expressed verbal understanding and agreement the above plan.      This note was created with the assistance of a voice recognition software or phone dictation. There may be transcription errors as a result of using this technology however minimal. Effort has been made to assure accuracy of transcription but any obvious errors or omissions should be clarified with the author of the document

## 2025-05-08 ENCOUNTER — TELEPHONE (OUTPATIENT)
Dept: ENDOCRINOLOGY | Facility: CLINIC | Age: 35
End: 2025-05-08
Payer: MEDICAID

## 2025-05-08 RX ORDER — INSULIN LISPRO 100 [IU]/ML
INJECTION, SOLUTION INTRAVENOUS; SUBCUTANEOUS
Qty: 2000 UNITS | Refills: 11 | Status: SHIPPED | OUTPATIENT
Start: 2025-05-08

## 2025-05-08 NOTE — TELEPHONE ENCOUNTER
For insurance purposes, [facility] requesting a Max daily dose of the insulin lispro 100 unit/mL injection. Please advise.

## 2025-05-08 NOTE — TELEPHONE ENCOUNTER
Please ask patient is a asking in regard of Omnipod insulin maximum dose , it is the maximum insulin dose.   Is he asking in regard to his regular insulin dosing that takes daily until he gets on Omnipod.  Please advise

## 2025-05-09 NOTE — TELEPHONE ENCOUNTER
Mr. De La Rosa spoke with Ms. Mccoy, with [facility], regarding prescription instructions.

## 2025-05-09 NOTE — TELEPHONE ENCOUNTER
----- Message from Casie sent at 5/9/2025  7:29 AM CDT -----  Patient of Estrella Message from Answering Serviceu 8-May-25 04:11p TAKENTo:         Office in [facility]:       [place]:         [facility] [place]:     [phone]:    Tomer PaoloDOB:        [birth year]Ref:        about his prescription needs directions on it. Please call  ClrID:   [phone]

## 2025-05-10 ENCOUNTER — PATIENT MESSAGE (OUTPATIENT)
Dept: ENDOCRINOLOGY | Facility: CLINIC | Age: 35
End: 2025-05-10
Payer: MEDICAID

## 2025-05-12 ENCOUNTER — TELEPHONE (OUTPATIENT)
Dept: DIABETES | Facility: CLINIC | Age: 35
End: 2025-05-12
Payer: MEDICAID

## 2025-05-12 NOTE — TELEPHONE ENCOUNTER
Spoke with pt and scheduled Omnipod 5 training 5/27/25. I let him know that we will set up his [employer] account with the Pro[employer] Code at training (from his previous message).

## 2025-05-26 ENCOUNTER — TELEPHONE (OUTPATIENT)
Dept: DIABETES | Facility: CLINIC | Age: 35
End: 2025-05-26
Payer: MEDICAID

## 2025-05-26 NOTE — TELEPHONE ENCOUNTER
I'm on the phone with Tomer right now and he is at the pharmacy trying to get his vial of insulin filled for our Omnipod training tomorrow. [facility]) states that Medicaid is denying his vial since he albita received his insulin pens earlier this month. The pharmacist has told Tomer that his provider needs to reach out to Medicaid to approve the vial of insulin. I have him scheduled for 9am tomorrow and he is driving from [place] so he will need to know something soon or I may need to reschedule his Omnipod start. He can be reached at [phone] if you have any questions.    Please keep me posted as well.    Thank you,    Radha Dooley

## 2025-05-27 ENCOUNTER — TELEPHONE (OUTPATIENT)
Dept: DIABETES | Facility: CLINIC | Age: 35
End: 2025-05-27
Payer: MEDICAID

## 2025-05-27 NOTE — TELEPHONE ENCOUNTER
Contacted pt and left VM for an update on getting insulin vial and to reschedule his Omnipod 5 training.

## 2025-05-27 NOTE — TELEPHONE ENCOUNTER
Good morning,    I rescheduled Tomer's Omnipod training for 6/9/25. He will need to get his vial of fast-acting insulin before then. As per our discussion yesterday with his pharmacy, he will need an approval for his Medicaid to pay for both the vial of insulin and his insulin pens.    Thank you,  Radha

## 2025-05-27 NOTE — TELEPHONE ENCOUNTER
Hello, the reason for insulin pens patient requested those as a backup in case Omnipod 5 fails.  Patient has been approved for insulin vials.  Patient does not need insulin pens.  Do I need to cancel insulin pens.  He has been approved for insulin vials.  Thanks

## 2025-05-27 NOTE — TELEPHONE ENCOUNTER
He told me that his pharmacist requested that you contact his medicaid to approve both. However, if you cancel the insulin pens, he would need syringes for back-up with his vial should his Omnipod stop. Thanks!

## 2025-05-30 ENCOUNTER — TELEPHONE (OUTPATIENT)
Dept: ENDOCRINOLOGY | Facility: CLINIC | Age: 35
End: 2025-05-30
Payer: MEDICAID

## 2025-05-30 NOTE — TELEPHONE ENCOUNTER
The phone, patient ID self with accurate date of birth.  Patient to  insulin lispro tomorrow and keep his appointment with Calli.  Advise patient to only refill insulin pens in the emergency situation.  Questions solicited and answered, patient verbalized understanding and agreed to plan of care

## 2025-06-06 ENCOUNTER — TELEPHONE (OUTPATIENT)
Dept: ENDOCRINOLOGY | Facility: CLINIC | Age: 35
End: 2025-06-06
Payer: MEDICAID

## 2025-06-09 ENCOUNTER — CLINICAL SUPPORT (OUTPATIENT)
Dept: DIABETES | Facility: CLINIC | Age: 35
End: 2025-06-09
Payer: MEDICAID

## 2025-06-09 DIAGNOSIS — E10.65 TYPE 1 DIABETES MELLITUS WITH HYPERGLYCEMIA: Primary | ICD-10-CM

## 2025-06-09 PROCEDURE — G0108 DIAB MANAGE TRN  PER INDIV: HCPCS | Mod: PBBFAC,PN | Performed by: DIETITIAN, REGISTERED

## 2025-06-09 NOTE — PROGRESS NOTES
Diabetes Care Specialist Progress Note  Author: Radha Dooley RD, CDCES  Date: 6/9/2025    Intake    Program Intake  Reason for Diabetes Program Visit:: Initial Diabetes Assessment  Type of Intervention:: Individual  Individual: Education  Education: Advanced Pump    Current Diabetes Treatment: Insulin  Method of insulin delivery?: Injections  Injection Type: Pens  Pen Type/Dose: Toujeo 30 units and Humalog (6 units with meals and 3-4 snacks)    Continuous Glucose Monitoring  Patient has CGM: Yes  Personal CGM type:: dexcom g7 with shayne just started today from [employer]    Lab Results   Component Value Date    HGBA1C 7.9 (H) 05/06/2025               There is no height or weight on file to calculate BMI.    Lifestyle Coping Support & Clinical    Lifestyle/Coping/Support  Does anyone in your family have diabetes or does anyone in your family support you in your diabetes care?: wife         Diabetes Self-Management Skills Assessment    Medication Skills Assessment  Patient is able to identify current diabetes medications, dosages, and appropriate timing of medications.: yes  Medication Skills Assessment Completed:: Yes  Assessment indicates:: Instruction Needed  Area of need?: Yes    Diabetes Disease Process/Treatment Options  Diabetes Disease Process/Treatment Options: Skills Assessment Completed: No  Deferred due to:: Time  Area of need?: Deferred    Nutrition/Healthy Eating  Patient can identify foods that impact blood sugar.: yes  Nutrition/Healthy Eating Skills Assessment Completed:: Yes  Assessment indicates:: Instruction Needed  Area of need?: Yes    Physical Activity/Exercise  Patient's daily activity level:: moderately active (runner)  Patient formally exercises outside of work.: yes  Patient can identify forms of physical activity.: yes  Physical Activity/Exercise Skills Assessment Completed: : Yes  Assessment indicates:: Instruction Needed  Area of need?: Yes    Home Blood Glucose  "Monitoring  Patient states that blood sugar is checked at home daily.: yes  Monitoring Method:: personal continuous glucose monitor  Personal CGM type:: dexcom g7 with shayne just started today from [email or web address]  Home Blood Glucose Monitoring Skills Assessment Completed: : Yes  Assessment indicates:: Instruction Needed  Area of need?: Yes    Acute Complications  Have you ever had hypoglycemia (low BG 70 or less)?: yes  How do you treat hypoglycemia?: juice  Acute Complications Skills Assessment Completed: : Yes  Assessment indicates:: Instruction Needed  Area of need?: Yes    Chronic Complications  Chronic Complications Skills Assessment Completed: : No  Deferred due to:: Time  Area of need?: Deferred      Assessment Summary and Plan    Based on today's diabetes care assessment, the following areas of need were identified:      Identified Areas of Need      Medication/Current Diabetes Treatment: Yes - see care plan.Reviewed pod change has 8 hr imani period and can record pod site. He would like to use iphone shayne. Instructed that it would need to be setup at pod change and he would need to re-enter settings. Reviewed where to find settings. Charge controller daily   Lifestyle Coping/Support:     Diabetes Disease Process/Treatment Options: Deferred   Nutrition/Healthy Eating: Yes reviewed entering carbs for meal bolus and using custom foods. Pt states he uses chat bot for carb counting assistance. Instruct to bolus before meals.    Physical Activity/Exercise: Yes reviewed using activity feature for his daily runs to prevent hypoglycemia. Suggest he not carb load before runs. Recommend pod pals.   Home Blood Glucose Monitoring: Yes reviewed sensor change in menu and keeping sensor and pod "in line of sight"   Acute Complications: Yes reviewed treating hypoglycemia with 5-8 gm carb.    Chronic Complications: Deferred       Today's interventions were provided through individual discussion, instruction, and written " materials were provided.      Patient verbalized understanding of instruction and written materials.  Pt was able to return back demonstration of instructions today. Patient understood key points, needs reinforcement and further instruction.     Diabetes Self-Management Care Plan:    Today's Diabetes Self-Management Care Plan was developed with Tomer's input. Tomer has agreed to work toward the following goal(s) to improve his/her overall diabetes control.      Care Plan: Diabetes Management   Updates made since 6/9/2024 12:00 AM        Problem: Medications         Goal: Pt will use OP5 for AID and    Start Date: 6/9/2025   Expected End Date: 6/16/2025   Barriers: No Barriers Identified   Note:      OMNIPOD CLASSIC INSULIN PUMP START    Patient here today for insulin pump training and will be starting an OmniPod Insulin pump.  Pump training was provided per OmniPod protocol.    Pt took Tresiba dose 6/8 at 10 am  - no temp basal needed.     Details of pump therapy were covered to include basic features of PDM programming, filling of pod / reservoir, automatic pod priming and insertion, pairing to dexcom g7 sensor, setting basal, bolus and other features in the set up menu.  Pt demonstrated the ability to program PDM and fill pod reservoir with rapid acting insulin, prime infusion set and inserted pod to right upper abdomen    Instructed on use of basic pump features. Reviewed site selection of pods, rotation of sites and hard stop on PDM to change every 72 hrs + low insulin in resevoir.   Instructed that insulin vial is good out of refrigeration for 30 days.   Reviewed treatment of hypoglycemia, hyperglycemia; sick day care, DKA and troubleshooting of pump.  Omni Pod 24 hour support line provided.   Patient instructed on Zenamins download, OmniPod packet included download instructions.     INITIAL SETTINGS:  Basal rate: .75 u/hr  Maximum basal rate: 1.5 u/hr    Bolus Menu:    Blood glucose Targets:  12AM-12AM  120    Correction factor:  12AM- 12AM = 47    Carb Ratio   12AM- 12AM = 13    Active insulin time: 3 hours  Maximum bolus = 15 units  Reverse Correction: on       Written materials provided. Patient/caregiver verbalized understanding of all instructions given.        Task: Discussed guidelines for preventing, detecting and treating hypoglycemia and hyperglycemia and reviewed the importance of meal and medication timing with diabetes mediations for prevention of hypoglycemia and maximum drug benefit. Completed 6/9/2025          Follow Up Plan     Follow up in about 2 weeks (around 6/23/2025) for OP5 f/u.    Today's care plan and follow up schedule was discussed with patient.  Tomer verbalized understanding of the care plan, goals, and agrees to follow up plan.        The patient was encouraged to communicate with his/her health care provider/physician and care team regarding his/her condition(s) and treatment.  I provided the patient with my contact information today and encouraged to contact me via phone or [facility] Patient Portal as needed.     Length of Visit   Total Time: 105 Minutes

## 2025-06-12 ENCOUNTER — TELEPHONE (OUTPATIENT)
Dept: DIABETES | Facility: CLINIC | Age: 35
End: 2025-06-12
Payer: MEDICAID

## 2025-06-12 NOTE — TELEPHONE ENCOUNTER
Spoke with pt for OP5 initiation f/u call. Day 2 after start and pt states going well. 100% Auto mode, 35% basal/65% bolus, 8.3 bolus/day, No overrides, 24 units insulin/day, 62% TIR, 2% low, 1% very low, 31% high, 4% very high, Avg glucose - 157 and 1005 CGM active. Pt reports grazing on 6/10 resulting in 11 boluses that day. Encouraged to limit to 3 meals per day and 1-2 snacks in upcoming days for automation of basal. Also instructed not to enter carbs when correcting a low blood sugar and to announce carbs before eating. Otherwise, max insulin delivery given while eating and late carb entry can result in hypoglycemia. Pt will start 2nd pod 6/12. No problems reported. Feels comfortable with pod. No adhesion/irritation problems. Pt has customer service # in phone. Will have virtual visit in 1 week for [place] review.

## 2025-06-16 ENCOUNTER — CLINICAL SUPPORT (OUTPATIENT)
Dept: DIABETES | Facility: CLINIC | Age: 35
End: 2025-06-16
Payer: MEDICAID

## 2025-06-16 DIAGNOSIS — E10.65 TYPE 1 DIABETES MELLITUS WITH HYPERGLYCEMIA: Primary | ICD-10-CM

## 2025-06-16 NOTE — PROGRESS NOTES
The patient location is: [place]  The chief complaint leading to consultation is: Omnipod 5 follow-up    Visit type: audiovisual    Face to Face time with patient: 45  75 minutes of total time spent on the encounter, which includes face to face time and non-face to face time preparing to see the patient (eg, review of tests), Obtaining and/or reviewing separately obtained history, Documenting clinical information in the electronic or other health record, Independently interpreting results (not separately reported) and communicating results to the patient/family/caregiver, or Care coordination (not separately reported).         Each patient to whom he or she provides medical services by telemedicine is:  (1) informed of the relationship between the physician and patient and the respective role of any other health care provider with respect to management of the patient; and (2) notified that he or she may decline to receive medical services by telemedicine and may withdraw from such care at any time.    Notes:           Diabetes Care Specialist Progress Note  Author: Radha Dooley RD, [facility]  Date: 6/16/2025    Intake    Program Intake  Reason for Diabetes Program Visit:: Intervention  Type of Intervention:: Individual  Individual: Education  Education: Self-Management Skill Review    Current Diabetes Treatment: Insulin  Method of insulin delivery?: Insulin Pump  Pen Type/Dose: Humalog  Type of Pump: OP5  Does patient have back-up plan?: Yes  Any problems obtaining supplies?: No        Continuous Glucose Monitoring  Patient has CGM: Yes  Personal CGM type:: dexcom g7 with phone shayne  GMI Date: 06/16/25  GMI Value: 6.9 %      Lab Results   Component Value Date    HGBA1C 7.9 (H) 05/06/2025               There is no height or weight on file to calculate BMI.    Lifestyle Coping Support & Clinical    Lifestyle/Coping/Support  Psychosocial/Coping Skills Assessment Completed: : No  Deffered due to:: Time  Area of need?:  Deferred         Diabetes Self-Management Skills Assessment    Medication Skills Assessment  Patient is able to identify current diabetes medications, dosages, and appropriate timing of medications.: yes  Patient reports problems or concerns with current medication regimen.: no  Patient is  aware that some diabetes medications can cause low blood sugar?: Yes  Medication Skills Assessment Completed:: Yes  Assessment indicates:: Instruction Needed  Area of need?: Yes      Diabetes Disease Process/Treatment Options  Diabetes Disease Process/Treatment Options: Skills Assessment Completed: No  Deferred due to:: Time  Area of need?: Deferred         Physical Activity/Exercise  Patient's daily activity level:: moderately active  Patient formally exercises outside of work.: yes  Patient can identify forms of physical activity.: yes  Physical Activity/Exercise Skills Assessment Completed: : Yes  Assessment indicates:: Instruction Needed  Area of need?: Yes    Home Blood Glucose Monitoring  Personal CGM type:: dexcom g7 with phone shayne  Home Blood Glucose Monitoring Skills Assessment Completed: : Yes  Assessment indicates:: Instruction Needed  Area of need?: Yes    Acute Complications  Acute Complications Skills Assessment Completed: : Yes  Assessment indicates:: Instruction Needed  Area of need?: Yes    Chronic Complications  Chronic Complications Skills Assessment Completed: : No  Deferred due to:: Time  Area of need?: Deferred      Assessment Summary and Plan    Based on today's diabetes care assessment, the following areas of need were identified:      Identified Areas of Need      Medication/Current Diabetes Treatment: Yes Doing well with staying 100% automated mode. Pt is wearing sensor on right arm and pod on right abdomen. Noted some loss of connection - 6% limited mode. Pt states that he has noticed some loss of connection and will work on placement of next pod. He is doing a great job with meal and correction bolus  habit. 58% bolus/42% basal on target and staying 75% TIR with 1% low, 1% very low, 20% high and 3% very high. Noted insulin suspension happens often. Will alert provider to see if adjustments are needed. Noted correction given last night and that lead to hypoglycemia w/in 3 hrs and again 6/10 at 6:34 am. May need to adjust correction setting. Reviewed timing of insulin prior to meals due to noting some late meal entries.    Lifestyle Coping/Support: Deferred   Diabetes Disease Process/Treatment Options: Deferred   Nutrition/Healthy Eating:      Physical Activity/Exercise: Yes see care plan   Home Blood Glucose Monitoring: Yes see above   Acute Complications: Yes reviewed treating low blood glucose with 6-8 grams of carbs   Chronic Complications: Deferred       Today's interventions were provided through individual discussion, instruction, and written materials were provided.      Patient verbalized understanding of instruction and written materials.  Pt was able to return back demonstration of instructions today. Patient understood key points, needs reinforcement and further instruction.     Diabetes Self-Management Care Plan:    Today's Diabetes Self-Management Care Plan was developed with Tomer's input. Tomer has agreed to work toward the following goal(s) to improve his/her overall diabetes control.      Care Plan: Diabetes Management   Updates made since 6/16/2024 12:00 AM        Problem: Medications Resolved 6/16/2025        Goal: Pt will use OP5 for AID Completed 6/16/2025   Start Date: 6/9/2025   Expected End Date: 6/16/2025   This Visit's Progress: Met   Barriers: No Barriers Identified   Note:      OMNIPOD CLASSIC INSULIN PUMP START    Patient here today for insulin pump training and will be starting an OmniPod Insulin pump.  Pump training was provided per OmniPod protocol.    Pt took Tresiba dose 6/8 at 10 am  - no temp basal needed.     Details of pump therapy were covered to include basic features of  PDM programming, filling of pod / reservoir, automatic pod priming and insertion, pairing to dexcom g7 sensor, setting basal, bolus and other features in the set up menu.  Pt demonstrated the ability to program PDM and fill pod reservoir with rapid acting insulin, prime infusion set and inserted pod to right upper abdomen    Instructed on use of basic pump features. Reviewed site selection of pods, rotation of sites and hard stop on PDM to change every 72 hrs + low insulin in resevoir.   Instructed that insulin vial is good out of refrigeration for 30 days.   Reviewed treatment of hypoglycemia, hyperglycemia; sick day care, DKA and troubleshooting of pump.  Omni Pod 24 hour support line provided.   Patient instructed on [employer] download, OmniPod packet included download instructions.     INITIAL SETTINGS:  Basal rate: .75 u/hr  Maximum basal rate: 1.5 u/hr    Bolus Menu:    Blood glucose Targets:  12AM-12AM 120    Correction factor:  12AM- 12AM = 47    Carb Ratio   12AM- 12AM = 13    Active insulin time: 3 hours  Maximum bolus = 15 units  Reverse Correction: on       Written materials provided. Patient/caregiver verbalized understanding of all instructions given.        Task: Discussed guidelines for preventing, detecting and treating hypoglycemia and hyperglycemia and reviewed the importance of meal and medication timing with diabetes mediations for prevention of hypoglycemia and maximum drug benefit. Completed 6/9/2025        Problem: Acute Complications         Goal: Pt agrees to use activity feature when going for runs to avoid hypoglycemia    Start Date: 6/16/2025   Expected End Date: 7/14/2025   Priority: High   Barriers: No Barriers Identified   Note:    Pt reports late night runs on 6/11 and 6/13 causing low blood glucose and forgot to use activity feature.       Task: Reviewed appropriate treatment for hypoglycemia using approximately 6-8 grams of carbs Completed 6/16/2025        Task: Reviewed common causes  and precautions to help prevent hyper/hypoglycemic events. Completed 6/16/2025        Task: Discussed, sick day planning, natural disaster planning, and/or travel planning to prevent hyper/hypoglycemia. Completed 6/16/2025          Follow Up Plan     Follow up in about 4 weeks (around 7/14/2025) for OP5f/u.    Today's care plan and follow up schedule was discussed with patient.  Tomer verbalized understanding of the care plan, goals, and agrees to follow up plan.        The patient was encouraged to communicate with his/her health care provider/physician and care team regarding his/her condition(s) and treatment.  I provided the patient with my contact information today and encouraged to contact me via phone or [facility] Patient Portal as needed.     Length of Visit   Total Time: 45 Minutes

## 2025-06-16 NOTE — Clinical Note
Estrella,  I had a virtual visit today. He is doing great with his OP5. Kiersten report is in Media. 100% automated mode. Wearing sensor on right arm and pod on right abdomen. Noted some loss of connection - 6% limited mode. States he has noticed some loss of connection and will work on placement of next pod. He is doing a great with meal and correction bolus habit. 58% bolus/42% basal and staying 75% TIR with 1% low, 1% very low, 20% high and 3% very high. Noted insulin suspension happens often. Noted correction given last night and that lead to hypoglycemia w/in 3 hrs and again 6/10 at 6:34 am. Will keep an eye on that to see if correction setting should be adjusted. Reviewed timing of insulin prior to meals due to noting some late meal entries. Some overnight lows noted. Pt reports that he went for a late night run on 6/11 and 6/13 and forgot to use the activity feature which he will begin using for his exercise. Overall, doing great! He sees  you 8/20 and me in 1 mth.  Thank you, Radha Dooley